# Patient Record
Sex: MALE | Race: OTHER | NOT HISPANIC OR LATINO | URBAN - METROPOLITAN AREA
[De-identification: names, ages, dates, MRNs, and addresses within clinical notes are randomized per-mention and may not be internally consistent; named-entity substitution may affect disease eponyms.]

---

## 2019-06-20 ENCOUNTER — INPATIENT (INPATIENT)
Facility: HOSPITAL | Age: 54
LOS: 8 days | Discharge: HOME | DRG: 286 | End: 2019-06-29
Attending: GENERAL PRACTICE | Admitting: GENERAL PRACTICE
Payer: COMMERCIAL

## 2019-06-20 VITALS
DIASTOLIC BLOOD PRESSURE: 99 MMHG | RESPIRATION RATE: 18 BRPM | OXYGEN SATURATION: 98 % | HEART RATE: 114 BPM | WEIGHT: 210.1 LBS | SYSTOLIC BLOOD PRESSURE: 148 MMHG | HEIGHT: 67 IN | TEMPERATURE: 98 F

## 2019-06-20 DIAGNOSIS — R94.31 ABNORMAL ELECTROCARDIOGRAM [ECG] [EKG]: ICD-10-CM

## 2019-06-20 DIAGNOSIS — E11.8 TYPE 2 DIABETES MELLITUS WITH UNSPECIFIED COMPLICATIONS: ICD-10-CM

## 2019-06-20 DIAGNOSIS — R31.0 GROSS HEMATURIA: ICD-10-CM

## 2019-06-20 DIAGNOSIS — N13.39 OTHER HYDRONEPHROSIS: ICD-10-CM

## 2019-06-20 DIAGNOSIS — I10 ESSENTIAL (PRIMARY) HYPERTENSION: ICD-10-CM

## 2019-06-20 DIAGNOSIS — E66.9 OBESITY, UNSPECIFIED: ICD-10-CM

## 2019-06-20 DIAGNOSIS — R74.8 ABNORMAL LEVELS OF OTHER SERUM ENZYMES: ICD-10-CM

## 2019-06-20 DIAGNOSIS — E78.3 HYPERCHYLOMICRONEMIA: ICD-10-CM

## 2019-06-20 DIAGNOSIS — I25.10 ATHEROSCLEROTIC HEART DISEASE OF NATIVE CORONARY ARTERY WITHOUT ANGINA PECTORIS: ICD-10-CM

## 2019-06-20 DIAGNOSIS — N21.1 CALCULUS IN URETHRA: ICD-10-CM

## 2019-06-20 DIAGNOSIS — E78.5 HYPERLIPIDEMIA, UNSPECIFIED: ICD-10-CM

## 2019-06-20 DIAGNOSIS — Z95.5 PRESENCE OF CORONARY ANGIOPLASTY IMPLANT AND GRAFT: Chronic | ICD-10-CM

## 2019-06-20 LAB
ALBUMIN SERPL ELPH-MCNC: 4 G/DL — SIGNIFICANT CHANGE UP (ref 3.3–5)
ALP SERPL-CCNC: 108 U/L — SIGNIFICANT CHANGE UP (ref 40–120)
ALT FLD-CCNC: 25 U/L — SIGNIFICANT CHANGE UP (ref 10–45)
ANION GAP SERPL CALC-SCNC: 16 MMOL/L — SIGNIFICANT CHANGE UP (ref 5–17)
APPEARANCE UR: CLEAR — SIGNIFICANT CHANGE UP
APTT BLD: 29.4 SEC — SIGNIFICANT CHANGE UP (ref 27.5–36.3)
APTT BLD: 34.2 SEC — SIGNIFICANT CHANGE UP (ref 27.5–36.3)
APTT BLD: 67.8 SEC — HIGH (ref 27.5–36.3)
AST SERPL-CCNC: 23 U/L — SIGNIFICANT CHANGE UP (ref 10–40)
BACTERIA # UR AUTO: NEGATIVE — SIGNIFICANT CHANGE UP
BASOPHILS # BLD AUTO: 0.1 K/UL — SIGNIFICANT CHANGE UP (ref 0–0.2)
BASOPHILS NFR BLD AUTO: 0.6 % — SIGNIFICANT CHANGE UP (ref 0–2)
BILIRUB SERPL-MCNC: 0.5 MG/DL — SIGNIFICANT CHANGE UP (ref 0.2–1.2)
BILIRUB UR-MCNC: NEGATIVE — SIGNIFICANT CHANGE UP
BLD GP AB SCN SERPL QL: NEGATIVE — SIGNIFICANT CHANGE UP
BUN SERPL-MCNC: 32 MG/DL — HIGH (ref 7–23)
CALCIUM SERPL-MCNC: 9.4 MG/DL — SIGNIFICANT CHANGE UP (ref 8.4–10.5)
CHLORIDE SERPL-SCNC: 98 MMOL/L — SIGNIFICANT CHANGE UP (ref 96–108)
CK MB BLD-MCNC: 3 % — SIGNIFICANT CHANGE UP (ref 0–3.5)
CK MB BLD-MCNC: 3 % — SIGNIFICANT CHANGE UP (ref 0–3.5)
CK MB CFR SERPL CALC: 6.5 NG/ML — SIGNIFICANT CHANGE UP (ref 0–6.7)
CK MB CFR SERPL CALC: 7.5 NG/ML — HIGH (ref 0–6.7)
CK SERPL-CCNC: 218 U/L — HIGH (ref 30–200)
CK SERPL-CCNC: 249 U/L — HIGH (ref 30–200)
CO2 SERPL-SCNC: 20 MMOL/L — LOW (ref 22–31)
COLOR SPEC: COLORLESS — SIGNIFICANT CHANGE UP
CREAT SERPL-MCNC: 1.66 MG/DL — HIGH (ref 0.5–1.3)
DIFF PNL FLD: ABNORMAL
EOSINOPHIL # BLD AUTO: 0.1 K/UL — SIGNIFICANT CHANGE UP (ref 0–0.5)
EOSINOPHIL NFR BLD AUTO: 0.7 % — SIGNIFICANT CHANGE UP (ref 0–6)
EPI CELLS # UR: 1 /HPF — SIGNIFICANT CHANGE UP
GAS PNL BLDV: SIGNIFICANT CHANGE UP
GLUCOSE BLDC GLUCOMTR-MCNC: 202 MG/DL — HIGH (ref 70–99)
GLUCOSE BLDC GLUCOMTR-MCNC: 273 MG/DL — HIGH (ref 70–99)
GLUCOSE BLDC GLUCOMTR-MCNC: 296 MG/DL — HIGH (ref 70–99)
GLUCOSE BLDC GLUCOMTR-MCNC: 304 MG/DL — HIGH (ref 70–99)
GLUCOSE SERPL-MCNC: 386 MG/DL — HIGH (ref 70–99)
GLUCOSE UR QL: ABNORMAL
HBA1C BLD-MCNC: 11.5 % — HIGH (ref 4–5.6)
HCT VFR BLD CALC: 47 % — SIGNIFICANT CHANGE UP (ref 39–50)
HCT VFR BLD CALC: 47.5 % — SIGNIFICANT CHANGE UP (ref 39–50)
HGB BLD-MCNC: 15.9 G/DL — SIGNIFICANT CHANGE UP (ref 13–17)
HGB BLD-MCNC: 16.5 G/DL — SIGNIFICANT CHANGE UP (ref 13–17)
HYALINE CASTS # UR AUTO: 0 /LPF — SIGNIFICANT CHANGE UP (ref 0–2)
INR BLD: 0.98 RATIO — SIGNIFICANT CHANGE UP (ref 0.88–1.16)
KETONES UR-MCNC: ABNORMAL
LEUKOCYTE ESTERASE UR-ACNC: NEGATIVE — SIGNIFICANT CHANGE UP
LIDOCAIN IGE QN: 37 U/L — SIGNIFICANT CHANGE UP (ref 7–60)
LYMPHOCYTES # BLD AUTO: 16 % — SIGNIFICANT CHANGE UP (ref 13–44)
LYMPHOCYTES # BLD AUTO: 2.3 K/UL — SIGNIFICANT CHANGE UP (ref 1–3.3)
MAGNESIUM SERPL-MCNC: 2 MG/DL — SIGNIFICANT CHANGE UP (ref 1.6–2.6)
MCHC RBC-ENTMCNC: 29.2 PG — SIGNIFICANT CHANGE UP (ref 27–34)
MCHC RBC-ENTMCNC: 30 PG — SIGNIFICANT CHANGE UP (ref 27–34)
MCHC RBC-ENTMCNC: 33.8 GM/DL — SIGNIFICANT CHANGE UP (ref 32–36)
MCHC RBC-ENTMCNC: 34.9 GM/DL — SIGNIFICANT CHANGE UP (ref 32–36)
MCV RBC AUTO: 86.2 FL — SIGNIFICANT CHANGE UP (ref 80–100)
MCV RBC AUTO: 86.5 FL — SIGNIFICANT CHANGE UP (ref 80–100)
MONOCYTES # BLD AUTO: 1.1 K/UL — HIGH (ref 0–0.9)
MONOCYTES NFR BLD AUTO: 7.8 % — SIGNIFICANT CHANGE UP (ref 2–14)
NEUTROPHILS # BLD AUTO: 10.7 K/UL — HIGH (ref 1.8–7.4)
NEUTROPHILS NFR BLD AUTO: 74.9 % — SIGNIFICANT CHANGE UP (ref 43–77)
NITRITE UR-MCNC: NEGATIVE — SIGNIFICANT CHANGE UP
PH UR: 6.5 — SIGNIFICANT CHANGE UP (ref 5–8)
PHOSPHATE SERPL-MCNC: 2.7 MG/DL — SIGNIFICANT CHANGE UP (ref 2.5–4.5)
PLATELET # BLD AUTO: 243 K/UL — SIGNIFICANT CHANGE UP (ref 150–400)
PLATELET # BLD AUTO: 264 K/UL — SIGNIFICANT CHANGE UP (ref 150–400)
POTASSIUM SERPL-MCNC: 4.9 MMOL/L — SIGNIFICANT CHANGE UP (ref 3.5–5.3)
POTASSIUM SERPL-SCNC: 4.9 MMOL/L — SIGNIFICANT CHANGE UP (ref 3.5–5.3)
PROT SERPL-MCNC: 8 G/DL — SIGNIFICANT CHANGE UP (ref 6–8.3)
PROT UR-MCNC: ABNORMAL
PROTHROM AB SERPL-ACNC: 11.2 SEC — SIGNIFICANT CHANGE UP (ref 10–12.9)
RBC # BLD: 5.43 M/UL — SIGNIFICANT CHANGE UP (ref 4.2–5.8)
RBC # BLD: 5.51 M/UL — SIGNIFICANT CHANGE UP (ref 4.2–5.8)
RBC # FLD: 13 % — SIGNIFICANT CHANGE UP (ref 10.3–14.5)
RBC # FLD: 13.1 % — SIGNIFICANT CHANGE UP (ref 10.3–14.5)
RBC CASTS # UR COMP ASSIST: 749 /HPF — HIGH (ref 0–4)
RH IG SCN BLD-IMP: POSITIVE — SIGNIFICANT CHANGE UP
SODIUM SERPL-SCNC: 134 MMOL/L — LOW (ref 135–145)
SP GR SPEC: 1.02 — SIGNIFICANT CHANGE UP (ref 1.01–1.02)
TROPONIN T, HIGH SENSITIVITY RESULT: 182 NG/L — HIGH (ref 0–51)
TROPONIN T, HIGH SENSITIVITY RESULT: 196 NG/L — HIGH (ref 0–51)
UROBILINOGEN FLD QL: NEGATIVE — SIGNIFICANT CHANGE UP
WBC # BLD: 13 K/UL — HIGH (ref 3.8–10.5)
WBC # BLD: 14.3 K/UL — HIGH (ref 3.8–10.5)
WBC # FLD AUTO: 13 K/UL — HIGH (ref 3.8–10.5)
WBC # FLD AUTO: 14.3 K/UL — HIGH (ref 3.8–10.5)
WBC UR QL: 2 /HPF — SIGNIFICANT CHANGE UP (ref 0–5)

## 2019-06-20 PROCEDURE — 93010 ELECTROCARDIOGRAM REPORT: CPT | Mod: NC

## 2019-06-20 PROCEDURE — 99285 EMERGENCY DEPT VISIT HI MDM: CPT | Mod: 25

## 2019-06-20 PROCEDURE — 99254 IP/OBS CNSLTJ NEW/EST MOD 60: CPT | Mod: GC

## 2019-06-20 PROCEDURE — 71275 CT ANGIOGRAPHY CHEST: CPT | Mod: 26

## 2019-06-20 PROCEDURE — 71045 X-RAY EXAM CHEST 1 VIEW: CPT | Mod: 26

## 2019-06-20 PROCEDURE — 99255 IP/OBS CONSLTJ NEW/EST HI 80: CPT

## 2019-06-20 PROCEDURE — 74174 CTA ABD&PLVS W/CONTRAST: CPT | Mod: 26

## 2019-06-20 RX ORDER — HEPARIN SODIUM 5000 [USP'U]/ML
INJECTION INTRAVENOUS; SUBCUTANEOUS
Qty: 25000 | Refills: 0 | Status: DISCONTINUED | OUTPATIENT
Start: 2019-06-20 | End: 2019-06-20

## 2019-06-20 RX ORDER — INSULIN GLARGINE 100 [IU]/ML
10 INJECTION, SOLUTION SUBCUTANEOUS AT BEDTIME
Refills: 0 | Status: DISCONTINUED | OUTPATIENT
Start: 2019-06-20 | End: 2019-06-20

## 2019-06-20 RX ORDER — INSULIN LISPRO 100/ML
VIAL (ML) SUBCUTANEOUS
Refills: 0 | Status: DISCONTINUED | OUTPATIENT
Start: 2019-06-20 | End: 2019-06-20

## 2019-06-20 RX ORDER — SIMVASTATIN 20 MG/1
20 TABLET, FILM COATED ORAL AT BEDTIME
Refills: 0 | Status: DISCONTINUED | OUTPATIENT
Start: 2019-06-20 | End: 2019-06-22

## 2019-06-20 RX ORDER — DEXTROSE 50 % IN WATER 50 %
25 SYRINGE (ML) INTRAVENOUS ONCE
Refills: 0 | Status: DISCONTINUED | OUTPATIENT
Start: 2019-06-20 | End: 2019-06-29

## 2019-06-20 RX ORDER — INSULIN LISPRO 100/ML
5 VIAL (ML) SUBCUTANEOUS
Refills: 0 | Status: DISCONTINUED | OUTPATIENT
Start: 2019-06-20 | End: 2019-06-21

## 2019-06-20 RX ORDER — SODIUM CHLORIDE 9 MG/ML
1000 INJECTION, SOLUTION INTRAVENOUS
Refills: 0 | Status: DISCONTINUED | OUTPATIENT
Start: 2019-06-20 | End: 2019-06-29

## 2019-06-20 RX ORDER — AMLODIPINE BESYLATE 2.5 MG/1
5 TABLET ORAL DAILY
Refills: 0 | Status: DISCONTINUED | OUTPATIENT
Start: 2019-06-20 | End: 2019-06-20

## 2019-06-20 RX ORDER — LOSARTAN POTASSIUM 100 MG/1
25 TABLET, FILM COATED ORAL DAILY
Refills: 0 | Status: DISCONTINUED | OUTPATIENT
Start: 2019-06-20 | End: 2019-06-21

## 2019-06-20 RX ORDER — DEXTROSE 50 % IN WATER 50 %
12.5 SYRINGE (ML) INTRAVENOUS ONCE
Refills: 0 | Status: DISCONTINUED | OUTPATIENT
Start: 2019-06-20 | End: 2019-06-20

## 2019-06-20 RX ORDER — HEPARIN SODIUM 5000 [USP'U]/ML
5600 INJECTION INTRAVENOUS; SUBCUTANEOUS EVERY 6 HOURS
Refills: 0 | Status: DISCONTINUED | OUTPATIENT
Start: 2019-06-20 | End: 2019-06-20

## 2019-06-20 RX ORDER — DEXTROSE 50 % IN WATER 50 %
15 SYRINGE (ML) INTRAVENOUS ONCE
Refills: 0 | Status: DISCONTINUED | OUTPATIENT
Start: 2019-06-20 | End: 2019-06-20

## 2019-06-20 RX ORDER — SIMVASTATIN 20 MG/1
1 TABLET, FILM COATED ORAL
Qty: 0 | Refills: 0 | DISCHARGE

## 2019-06-20 RX ORDER — INSULIN GLARGINE 100 [IU]/ML
25 INJECTION, SOLUTION SUBCUTANEOUS AT BEDTIME
Refills: 0 | Status: DISCONTINUED | OUTPATIENT
Start: 2019-06-20 | End: 2019-06-23

## 2019-06-20 RX ORDER — DEXTROSE 50 % IN WATER 50 %
25 SYRINGE (ML) INTRAVENOUS ONCE
Refills: 0 | Status: DISCONTINUED | OUTPATIENT
Start: 2019-06-20 | End: 2019-06-20

## 2019-06-20 RX ORDER — DEXTROSE 50 % IN WATER 50 %
12.5 SYRINGE (ML) INTRAVENOUS ONCE
Refills: 0 | Status: DISCONTINUED | OUTPATIENT
Start: 2019-06-20 | End: 2019-06-29

## 2019-06-20 RX ORDER — SODIUM CHLORIDE 9 MG/ML
500 INJECTION INTRAMUSCULAR; INTRAVENOUS; SUBCUTANEOUS ONCE
Refills: 0 | Status: COMPLETED | OUTPATIENT
Start: 2019-06-20 | End: 2019-06-20

## 2019-06-20 RX ORDER — GLUCAGON INJECTION, SOLUTION 0.5 MG/.1ML
1 INJECTION, SOLUTION SUBCUTANEOUS ONCE
Refills: 0 | Status: DISCONTINUED | OUTPATIENT
Start: 2019-06-20 | End: 2019-06-29

## 2019-06-20 RX ORDER — LOSARTAN POTASSIUM 100 MG/1
0 TABLET, FILM COATED ORAL
Qty: 0 | Refills: 0 | DISCHARGE

## 2019-06-20 RX ORDER — DEXTROSE 50 % IN WATER 50 %
15 SYRINGE (ML) INTRAVENOUS ONCE
Refills: 0 | Status: DISCONTINUED | OUTPATIENT
Start: 2019-06-20 | End: 2019-06-29

## 2019-06-20 RX ORDER — INSULIN LISPRO 100/ML
VIAL (ML) SUBCUTANEOUS
Refills: 0 | Status: DISCONTINUED | OUTPATIENT
Start: 2019-06-20 | End: 2019-06-29

## 2019-06-20 RX ORDER — ASPIRIN/CALCIUM CARB/MAGNESIUM 324 MG
81 TABLET ORAL DAILY
Refills: 0 | Status: DISCONTINUED | OUTPATIENT
Start: 2019-06-20 | End: 2019-06-29

## 2019-06-20 RX ORDER — AMLODIPINE BESYLATE 2.5 MG/1
5 TABLET ORAL DAILY
Refills: 0 | Status: DISCONTINUED | OUTPATIENT
Start: 2019-06-20 | End: 2019-06-25

## 2019-06-20 RX ORDER — GLUCAGON INJECTION, SOLUTION 0.5 MG/.1ML
1 INJECTION, SOLUTION SUBCUTANEOUS ONCE
Refills: 0 | Status: DISCONTINUED | OUTPATIENT
Start: 2019-06-20 | End: 2019-06-20

## 2019-06-20 RX ORDER — INSULIN LISPRO 100/ML
VIAL (ML) SUBCUTANEOUS AT BEDTIME
Refills: 0 | Status: DISCONTINUED | OUTPATIENT
Start: 2019-06-20 | End: 2019-06-20

## 2019-06-20 RX ORDER — SODIUM CHLORIDE 9 MG/ML
1000 INJECTION, SOLUTION INTRAVENOUS
Refills: 0 | Status: DISCONTINUED | OUTPATIENT
Start: 2019-06-20 | End: 2019-06-20

## 2019-06-20 RX ORDER — HEPARIN SODIUM 5000 [USP'U]/ML
5000 INJECTION INTRAVENOUS; SUBCUTANEOUS EVERY 8 HOURS
Refills: 0 | Status: DISCONTINUED | OUTPATIENT
Start: 2019-06-20 | End: 2019-06-23

## 2019-06-20 RX ORDER — INSULIN DETEMIR 100/ML (3)
0 INSULIN PEN (ML) SUBCUTANEOUS
Qty: 0 | Refills: 0 | DISCHARGE

## 2019-06-20 RX ADMIN — Medication 5 UNIT(S): at 17:32

## 2019-06-20 RX ADMIN — Medication 1 TABLET(S): at 13:56

## 2019-06-20 RX ADMIN — SODIUM CHLORIDE 500 MILLILITER(S): 9 INJECTION INTRAMUSCULAR; INTRAVENOUS; SUBCUTANEOUS at 06:25

## 2019-06-20 RX ADMIN — SIMVASTATIN 20 MILLIGRAM(S): 20 TABLET, FILM COATED ORAL at 22:02

## 2019-06-20 RX ADMIN — Medication 81 MILLIGRAM(S): at 13:57

## 2019-06-20 RX ADMIN — HEPARIN SODIUM 1300 UNIT(S)/HR: 5000 INJECTION INTRAVENOUS; SUBCUTANEOUS at 18:44

## 2019-06-20 RX ADMIN — Medication 3: at 17:31

## 2019-06-20 RX ADMIN — INSULIN GLARGINE 25 UNIT(S): 100 INJECTION, SOLUTION SUBCUTANEOUS at 22:02

## 2019-06-20 RX ADMIN — HEPARIN SODIUM 5000 UNIT(S): 5000 INJECTION INTRAVENOUS; SUBCUTANEOUS at 22:01

## 2019-06-20 RX ADMIN — HEPARIN SODIUM 1000 UNIT(S)/HR: 5000 INJECTION INTRAVENOUS; SUBCUTANEOUS at 09:07

## 2019-06-20 RX ADMIN — Medication 3: at 13:48

## 2019-06-20 RX ADMIN — HEPARIN SODIUM 5600 UNIT(S): 5000 INJECTION INTRAVENOUS; SUBCUTANEOUS at 18:44

## 2019-06-20 RX ADMIN — LOSARTAN POTASSIUM 25 MILLIGRAM(S): 100 TABLET, FILM COATED ORAL at 13:57

## 2019-06-20 RX ADMIN — AMLODIPINE BESYLATE 5 MILLIGRAM(S): 2.5 TABLET ORAL at 13:56

## 2019-06-20 NOTE — PROVIDER CONTACT NOTE (OTHER) - ACTION/TREATMENT ORDERED:
NP aware, at the bedside to assess. repeat BP left arm 101/67 right arm 84/54. awaiting orders. will continue to monitor.

## 2019-06-20 NOTE — H&P ADULT - PROBLEM SELECTOR PLAN 6
due to above:  per CT scan likely due to passed stone ( stone seen in bladder)   monitor for now  keep norris for now until pt better and OOB / ambulatory

## 2019-06-20 NOTE — CONSULT NOTE ADULT - ASSESSMENT
ELIZABETH LIPSCOMB is a 52 yo M with poorly controlled Type 2 DM, on basal insulin plus metformin/Januvia combination, complicated by CAD s/p cardiac stents x2, presenting with abdominal pain, likely due to renal stone, found to have hyperglycemia and A1C of 11.5%

## 2019-06-20 NOTE — ED ADULT NURSE REASSESSMENT NOTE - NS ED NURSE REASSESS COMMENT FT1
Received report from Kelli WELLER. Patient resting comfortably in stretcher. A&Ox4. VSS. NAD noted. Reports 5/10 periumbilical pain. Abdomen soft, non-distended and non-tender upon palpation. Patient denies chest pain, SOB, fever/chills at this time. Patient pending CT angiogram results. Plan of care discussed. Safety and comfort measures maintained. Received report from Kelli WELLER. Patient resting comfortably in stretcher. A&Ox4. VSS. NAD noted. Reports 5/10 periumbilical pain. Abdomen soft, non-distended and non-tender upon palpation. Patient denies chest pain, SOB, fever/chills at this time. Patient pending CT angiogram results. Mc catheter in place. 500 mL of red tinged urine in the drainage bag. Plan of care discussed. Safety and comfort measures maintained.

## 2019-06-20 NOTE — ED PROVIDER NOTE - ATTENDING CONTRIBUTION TO CARE
Afebrile. Awake and Alert. Lungs CTA. Heart tachy regular. Abdomen soft NTND. CN II-XII grossly intact. Moves all extremities without lateralization.    Pt transferred in stable condition from OSH for NSTEMI on Hep ggt, ASA and Plavix given PTA, No chest pain. EKG abnormal biphasic t-waves. Troponin elevated. Cardiology fellow notified on arrival.    Left flank pain radiating into arm: Will obtain CTA r/o dissection    Passed left renal stone on CT a/p. Cleared for tele admit by cardiology service.

## 2019-06-20 NOTE — CONSULT NOTE ADULT - PROBLEM SELECTOR RECOMMENDATION 3
Patient high ASCVD risk given prior history of CAD.    LDL goal should be less than 70m/dl, please check lipid panel  Would recommend increasing Statin those to high intensity if able to tolerate with Atorvastatin 40-80mg/d or Crestor 20mg daily if not at goal.

## 2019-06-20 NOTE — ED ADULT NURSE NOTE - OBJECTIVE STATEMENT
53 year old male presents to the ED via EMS as tx from [facility] for cardiology. Pt went to [facility] yesterday for LLQ abd pain, denied CP or SOB, found to have ST elevation in 2 leads. Pt has hx of 2 stents, last one 7-8 years ago. Upon arrival to ED, tachypneic, 2LNC in place O2 sat 96%. Pt unable to urinate since 12 midnight, bladder scan showed >300cc urine, norris inserted with 2 RN in place via sterile technique and 500cc dark yellow urine drained. Pt still denies CP, N/V/D, or back pain. Comfort and safety measures maintained.

## 2019-06-20 NOTE — ED ADULT NURSE NOTE - NSIMPLEMENTINTERV_GEN_ALL_ED
Implemented All Fall with Harm Risk Interventions:  [place] to call system. Call bell, personal items and telephone within reach. Instruct patient to call for assistance. Room bathroom lighting operational. Non-slip footwear when patient is off stretcher. Physically safe environment: no spills, clutter or unnecessary equipment. Stretcher in lowest position, wheels locked, appropriate side rails in place. Provide visual cue, wrist band, yellow gown, etc. Monitor gait and stability. Monitor for mental status changes and reorient to person, place, and time. Review medications for side effects contributing to fall risk. Reinforce activity limits and safety measures with patient and family. Provide visual clues: red socks.

## 2019-06-20 NOTE — ED PROVIDER NOTE - OBJECTIVE STATEMENT
52 y/o male PMHx CAD s/p stent x2, HTN, HLD, DM on insulin transferred via EMS for ST elevation V2-V4 seen on EKG at [facility]. Patient stated he had LLQ pain and left hand numbness. Patient stated the abdominal pain started 7AM and was intermittent. Patient had two episodes bilious non bloody emesis prior to [facility] Arrival. Patient reported the pain improved with Advil 400mg BID last dose 8pm. Patient started he was unable to void since 5 hours ago. Patient noted to ARTURO on EKG therefore per EMS patient received ASA 325mg, heparin bolus, Brilinta 180, and hydraz 10mg IV as pt noted to be hypertensive SBP 220s. Patient ISRAEL, SOB, back pain, neck pain, dizziness, weakness in extremity, blurry vision, gait abnormality, slurred speech. Patient last stent was placed 6 years ago and last stress was 1 year ago which was reportedly normal per patient. Patient does not remember the name of cardiologist at [facility]. Patient former smoker 1/2ppd x10 years and last smoker 5 years ago. Denied family h/o CAD <60 years 54 y/o male PMHx CAD s/p stent x2, HTN, HLD, DM on insulin transferred via EMS for ST elevation V2-V4 seen on EKG at [facility]. Patient stated he had LLQ pain and left hand numbness. Patient stated the abdominal pain started 7AM and was intermittent. Patient had two episodes bilious non bloody emesis prior to [facility] Arrival. Patient reported the pain improved with Advil 400mg BID last dose 8pm. Patient started he was unable to void since 5 hours ago. Patient noted to ARTURO on EKG therefore per EMS patient received ASA 325mg, heparin bolus, Brilinta 180, and hydraz 10mg IV as pt noted to be hypertensive SBP 220s. Patient ISRAEL, SOB, back pain, neck pain, dizziness, weakness in extremity, blurry vision, gait abnormality, slurred speech. Patient last stent was placed 6 years ago and last stress was 1 year ago which was reportedly normal per patient. Patient does not remember the name of cardiologist at [facility]. Patient former smoker 1/2ppd x10 years and last smoker 5 years ago. Denied family h/o CAD <60 years. Patient returned from [facility] on 6/18/19

## 2019-06-20 NOTE — CONSULT NOTE ADULT - NSHPATTENDINGPLANDISCUSS_GEN_ALL_CORE
Plan discussed with cardiology fellow, Dr. Phelps; patient seen and examined.       I was physically present for the key portions of the evaluation and management (E/M) service provided.    I agree with the above history, physical, and plan which I have reviewed and edited where appropriate.
NP Zabrina

## 2019-06-20 NOTE — CONSULT NOTE ADULT - PROBLEM SELECTOR RECOMMENDATION 9
Poorly uncontrolled due to dietary indiscretion as well as non-adherence to medications, missing basal insulin multiple times a week.   Discussed basal bolus regimen on discharge which he agree to.  Right now he is NPO.   -Will increase Lantus to 25 units at bedtime  -Continue with Humalog low does sliding scald qac and at bedtime, if starting PO, would recommend adding on Humalog 6 units TID with meals and will titrate up/down from there  -RD consult please! Poor understanding of dietary recommendations.      If patient wishes to follow up with [facility]   [facility]  [address] [place] [address], [address], [place] [address]  [phone]   Please call to schedule appointment with CDE/Nutritionist and MD appointment next available    For discharge, basal bolus (does to be determined) plus metformin 500mg bid (but because patient received contrast, would hold for 72 hours post contrast dose)

## 2019-06-20 NOTE — ED ADULT TRIAGE NOTE - CHIEF COMPLAINT QUOTE
brought in by ems as a transfer from [facility] for cards evaluation  ekg changes and hypertensive

## 2019-06-20 NOTE — ED PROVIDER NOTE - PROGRESS NOTE DETAILS
Cally URIBE: Received signout on patient. No acute intervention from cards. Started hep drip admitted to [facility] on tele.

## 2019-06-20 NOTE — H&P ADULT - HISTORY OF PRESENT ILLNESS
>>>>>>Medical Attending Initial / Admission note<<<<<<  -------------------------------------------------------------------------------  CHIEF COMPLAINT & HISTORY OF PRESENT ILLNESS:      pt is a 54 yo man with PMH of CAD (2 stents 6-7 yrs ago), DM, HTN, HLD, former smoker, presented to [facility] with c/o lower abd pain.   He was noted to have new biphasic t-waves in V2-V6 and transferred to [facility] for new EKG abnormalities.   Pt denies any recent chest pain or dyspnea at rest or with exertion.  No palpitations, dizziness, peripheral edema.   He reported normal stress test and echo within the past year ( as per cardio note).    His chief complaint is LLQ abd pain that started yesterday and was associated with dysuria  a Norris catheter was placed in ED for difficulty urinating / retaining and a CT showed likely a passed left renal stone, with stone seen in the bladder  pt has been started and remains on heparin drip with mildly blood tinged urine in norris bag    --------------------------------------------------------------------------------  PAST MEDICAL HISTORY:    DM  HTN  CAD  HLD  [obesity]    --------------------------------------------------------------------------------  PAST SURGICAL HISTORY:  none reported    --------------------------------------------------------------------------------  FAMILY HISTORY:  none reported ( no FH of cancer or heart diease !)     --------------------------------------------------------------------------------  SOCIAL HISTORY:  Alcohol: + socially   Smoking: past smoker 1/2 PPD for 10 years, Quit ~30 years ago  works as a  	    --------------------------------------------------------------------------------  ALLERGIES:    [As jenae, reviewed]    --------------------------------------------------------------------------------  MEDICATIONS:   [As jenae, reviewed]    --------------------------------------------------------------------------------  REVIEW OF SYSTEM:    GEN: no fever, no chills, + still with mild discomfort in lower abdomen   RESP: no SOB, no cough, no sputum  CVS: no chest pain, no palpitations, no edema  GI: abdominal pain as above , no nausea, no vomiting, no constipation  : discomfort as above   Neuro: no headache, no dizziness  PSYCH: no anxiety, no depression  Derm : no itching, no rash    --------------------------------------------------------------------------------  VITAL SIGNS:     Vital Signs Last 24 Hrs  T(C): 36.9 (06-20-19 @ 10:29)  T(F): 98.5 (06-20-19 @ 10:29), Max: 98.5 (06-20-19 @ 10:29)  HR: 108 (06-20-19 @ 10:29) (108 - 116)  BP: 168/106 (06-20-19 @ 10:29)  RR: 20 (06-20-19 @ 10:29) (18 - 26)  SpO2: 98% (06-20-19 @ 10:29) (98% - 99%)      POCT Blood Glucose.: 304 mg/dL (20 Jun 2019 10:18)  POCT Blood Glucose.: 324 mg/dL (20 Jun 2019 05:11)    --------------------------------------------------------------------------------  PHYSICAL EXAM:    GEN: A&O X 3 , NAD , comfortable, snoring while asleep   HEENT: NCAT, PERRL, MMM, hearing intact  Neck: supple , no JVD  CVS: S1S2 , regular , No M/R/G appreciated  PULM: CTA B/L,  no W/R/R appreciated  ABD.: soft. non tender, non distended,  bowel sounds present, obese   Extrem: intact pulses , no edema      + norris with mildly blood tinged urine   Derm: No rash , no ecchymoses  PSYCH : normal mood,  no delusion not anxious    --------------------------------------------------------------------------------  LAB AND IMAGING:   [As bellow, reviewed]    --------------------------------------------------------------------------------  ASSESSMENT AND PLAN:   [As bellow]    --------------------  Case discussed with pt  ___________________________  HMelida De León D.O.  Pager: [phone]

## 2019-06-20 NOTE — CONSULT NOTE ADULT - SUBJECTIVE AND OBJECTIVE BOX
HPI:    pt is a 54 yo man with PMH of CAD (2 stents 6-7 yrs ago), DM, HTN, HLD, former smoker, presented to [facility] with c/o lower abd pain.   He was noted to have new biphasic t-waves in V2-V6 and transferred to [facility] for new EKG abnormalities.   Pt denies any recent chest pain or dyspnea at rest or with exertion.  No palpitations, dizziness, peripheral edema.   He reported normal stress test and echo within the past year ( as per cardio note).    His chief complaint is LLQ abd pain that started yesterday and was associated with dysuria  a Norris catheter was placed in ED for difficulty urinating / retaining and a CT showed likely a passed left renal stone, with stone seen in the bladder  pt has been started and remains on heparin drip with mildly blood tinged urine in norris bag      PAST MEDICAL & SURGICAL HISTORY:  CAD (coronary artery disease): s/p stent x2 in 2013  Diabetes  HLD (hyperlipidemia)  HTN (hypertension)  Stented coronary artery      FAMILY HISTORY:      Social History:    Outpatient Medications:      MEDICATIONS  (STANDING):  amLODIPine   Tablet 5 milliGRAM(s) Oral daily  aspirin enteric coated 81 milliGRAM(s) Oral daily  dextrose 5%. 1000 milliLiter(s) (50 mL/Hr) IV Continuous <Continuous>  dextrose 50% Injectable 12.5 Gram(s) IV Push once  dextrose 50% Injectable 25 Gram(s) IV Push once  dextrose 50% Injectable 25 Gram(s) IV Push once  heparin  Infusion.  Unit(s)/Hr (10 mL/Hr) IV Continuous <Continuous>  insulin glargine Injectable (LANTUS) 10 Unit(s) SubCutaneous at bedtime  insulin lispro (HumaLOG) corrective regimen sliding scale   SubCutaneous three times a day before meals  losartan 25 milliGRAM(s) Oral daily  multivitamin 1 Tablet(s) Oral daily  simvastatin 20 milliGRAM(s) Oral at bedtime    MEDICATIONS  (PRN):  dextrose 40% Gel 15 Gram(s) Oral once PRN Blood Glucose LESS THAN 70 milliGRAM(s)/deciliter  glucagon  Injectable 1 milliGRAM(s) IntraMuscular once PRN Glucose LESS THAN 70 milligrams/deciliter  heparin  Injectable 5600 Unit(s) IV Push every 6 hours PRN For aPTT less than 40      Allergies    No Known Allergies    Intolerances      Review of Systems:  Constitutional: No fever  Eyes: No blurry vision  Neuro: No tremors  HEENT: No pain  Cardiovascular: No chest pain, palpitations  Respiratory: No SOB, no cough  GI: No nausea, vomiting, abdominal pain  : No dysuria  Skin: no rash  Psych: no depression  Endocrine: no polyuria, polydipsia  Hem/lymph: no swelling  Osteoporosis: no fractures    ALL OTHER SYSTEMS REVIEWED AND NEGATIVE    UNABLE TO OBTAIN    PHYSICAL EXAM:  -----------------------------  VITALS: T(C): 36.9 (06-20-19 @ 10:29)  T(F): 98.5 (06-20-19 @ 10:29), Max: 98.5 (06-20-19 @ 10:29)  HR: 108 (06-20-19 @ 10:29) (108 - 116)  BP: 168/106 (06-20-19 @ 10:29) (148/99 - 173/100)  RR:  (18 - 26)  SpO2:  (98% - 99%)  Wt(kg): --  GENERAL: NAD, well-groomed, well-developed  EYES: No proptosis, no lid lag, anicteric  HEENT:  Atraumatic, Normocephalic, moist mucous membranes  THYROID: Normal size, no palpable nodules  RESPIRATORY: Clear to auscultation bilaterally; No rales, rhonchi, wheezing, or rubs  CARDIOVASCULAR: Regular rate and rhythm; No murmurs; no peripheral edema  GI: Soft, nontender, non distended, normal bowel sounds  SKIN: Dry, intact, No rashes or lesions  MUSCULOSKELETAL: Full range of motion, normal strength  NEURO: sensation intact, extraocular movements intact, no tremor, normal reflexes  PSYCH: Alert and oriented x 3, normal affect, normal mood  CUSHING'S SIGNS: no striae    POCT Blood Glucose.: 273 mg/dL (06-20-19 @ 13:00)  POCT Blood Glucose.: 304 mg/dL (06-20-19 @ 10:18)  POCT Blood Glucose.: 324 mg/dL (06-20-19 @ 05:11)                            16.5   14.3  )-----------( 264      ( 20 Jun 2019 05:17 )             47.5       06-20    134<L>  |  98  |  32<H>  ----------------------------<  386<H>  4.9   |  20<L>  |  1.66<H>    EGFR if : 53<L>  EGFR if non : 46<L>    Ca    9.4      06-20  Mg     2.0     06-20  Phos  2.7     06-20    TPro  8.0  /  Alb  4.0  /  TBili  0.5  /  DBili  x   /  AST  23  /  ALT  25  /  AlkPhos  108  06-20      Thyroid Function Tests:      Hemoglobin A1C, Whole Blood: 11.5 % <H> [4.0 - 5.6] (06-20-19 @ 09:26)          Radiology:   ------------------------ HPI:    pt is a 52 yo man with PMH of CAD (2 stents 6-7 yrs ago), DM, HTN, HLD, former smoker, presented to [facility] with c/o lower abd pain.   He was noted to have new biphasic t-waves in V2-V6 and transferred to [facility] for new EKG abnormalities.   Pt denies any recent chest pain or dyspnea at rest or with exertion.  No palpitations, dizziness, peripheral edema.   He reported normal stress test and echo within the past year ( as per cardio note).    His chief complaint is LLQ abd pain that started yesterday and was associated with dysuria  a Norris catheter was placed in ED for difficulty urinating / retaining and a CT showed likely a passed left renal stone, with stone seen in the bladder  pt has been started and remains on heparin drip with mildly blood tinged urine in norris bag.     Patient states that he was diagnosed with Type 2 DM about 10-20 years ago.  Complicated by CAD s/p 2 cardiac stents.  Patient denies any microvascular complications such as retinopathy (last CRYSTAL 2 days ago), no neuropathy (but has not seen podiatry for a while), there is nephropathy (EGFR 46 during this admission 6/20/2019).  He is followed by his primary care doctor at [facility].  Patient's current regimen include Basaglar 25 units at bedtime and Janumet 50/1000 twice daily.  He checks his glucose 2-3 times a week.  He states that the numbers are usually high.  He also states that he misses Basaglar does about 2-3 times a week.  He is employed, does  works.  He is from [facility].  In terms of diet, breakfast: cup of tea, sandwich, lunch: usually snacks and reports that he consumes a large dinner with lots of Carbs.      Regarding hypertension: He current takes amlodipine 5mg daily and Losartan 25mg daily.  He denies hypotension.    Regarding HLD, currently takes simvastatin 20mg daily     PAST MEDICAL & SURGICAL HISTORY:  CAD (coronary artery disease): s/p stent x2 in 2013  Diabetes  HLD (hyperlipidemia)  HTN (hypertension)  Stented coronary artery      FAMILY HISTORY:  No family history of heart disease     Social History:  Former smoker, quitted 13 years ago.   No alcohol  No drugs    Home Medications:  aspirin 81 mg oral delayed release capsule: 1 tab(s) orally once a day (20 Jun 2019 13:45)  Janumet 50 mg-1000 mg oral tablet: 1 tab(s) orally 2 times a day (20 Jun 2019 13:45)  Levemir FlexPen 100 units/mL subcutaneous solution: 20-25 units once a day at bedtime (20 Jun 2019 13:45)  Lipitor 10 mg oral tablet: 1 tab(s) orally once a day (20 Jun 2019 13:45)  Norvasc 5 mg oral tablet: 1 tab(s) orally once a day (20 Jun 2019 13:45)    MEDICATIONS  (STANDING):  amLODIPine   Tablet 5 milliGRAM(s) Oral daily  aspirin enteric coated 81 milliGRAM(s) Oral daily  heparin  Infusion.  Unit(s)/Hr (10 mL/Hr) IV Continuous <Continuous>  insulin glargine Injectable (LANTUS) 10 Unit(s) SubCutaneous at bedtime  insulin lispro (HumaLOG) corrective regimen sliding scale   SubCutaneous three times a day before meals  losartan 25 milliGRAM(s) Oral daily  multivitamin 1 Tablet(s) Oral daily  simvastatin 20 milliGRAM(s) Oral at bedtime    MEDICATIONS  (PRN):  dextrose 40% Gel 15 Gram(s) Oral once PRN Blood Glucose LESS THAN 70 milliGRAM(s)/deciliter  glucagon  Injectable 1 milliGRAM(s) IntraMuscular once PRN Glucose LESS THAN 70 milligrams/deciliter  heparin  Injectable 5600 Unit(s) IV Push every 6 hours PRN For aPTT less than 40      Allergies    No Known Allergies    Review of Systems:  Constitutional: No fever  Eyes: No blurry vision  Neuro: No tremors  HEENT: No pain  Cardiovascular: No chest pain, palpitations  Respiratory: No SOB, no cough  GI: No nausea, vomiting, + left lower quadrant abdominal pain  : No dysuria  Skin: no rash  Psych: no depression  Endocrine: no polyuria, polydipsia  Hem/lymph: no swelling  Osteoporosis: no fractures    ALL OTHER SYSTEMS REVIEWED AND NEGATIVE    PHYSICAL EXAM:  -----------------------------  VITALS: T(C): 36.9 (06-20-19 @ 10:29)  T(F): 98.5 (06-20-19 @ 10:29), Max: 98.5 (06-20-19 @ 10:29)  HR: 108 (06-20-19 @ 10:29) (108 - 116)  BP: 168/106 (06-20-19 @ 10:29) (148/99 - 173/100)  RR:  (18 - 26)  SpO2:  (98% - 99%)  Wt(kg): --  GENERAL: NAD, well-groomed, well-developed  EYES: No proptosis, no lid lag, anicteric  HEENT:  Atraumatic, Normocephalic, moist mucous membranes  THYROID: Normal size, no palpable nodules  RESPIRATORY: Clear to auscultation bilaterally; No rales, rhonchi, wheezing, or rubs  CARDIOVASCULAR: Regular rate and rhythm; No murmurs; no peripheral edema  GI: Soft, nontender, non distended, normal bowel sounds  SKIN: Dry, intact, No rashes or lesions, dry feet, foot exam: visual exam, dry.  Thickened nails bilatterally.  Sensations intake.   MUSCULOSKELETAL: Full range of motion, normal strength  NEURO: sensation intact, extraocular movements intact, no tremor, normal reflexes  PSYCH: Alert and oriented x 3, normal affect, normal mood  CUSHING'S SIGNS: no striae    POCT Blood Glucose.: 273 mg/dL (06-20-19 @ 13:00)  POCT Blood Glucose.: 304 mg/dL (06-20-19 @ 10:18)  POCT Blood Glucose.: 324 mg/dL (06-20-19 @ 05:11)                           16.5   14.3  )-----------( 264      ( 20 Jun 2019 05:17 )             47.5       06-20    134<L>  |  98  |  32<H>  ----------------------------<  386<H>  4.9   |  20<L>  |  1.66<H>    EGFR if : 53<L>  EGFR if non : 46<L>    Ca    9.4      06-20  Mg     2.0     06-20  Phos  2.7     06-20    TPro  8.0  /  Alb  4.0  /  TBili  0.5  /  DBili  x   /  AST  23  /  ALT  25  /  AlkPhos  108  06-20      Thyroid Function Tests:      Hemoglobin A1C, Whole Blood: 11.5 % <H> [4.0 - 5.6] (06-20-19 @ 09:26)      Radiology:   ------------------------  < from: CT Angio Abdomen and Pelvis w/ IV Cont (06.20.19 @ 06:15) >  EXAM:  CT ANGIO CHEST (W)AW IC                          EXAM:  CT ANGIO ABD PELV (W)AW IC                            PROCEDURE DATE:  06/20/2019            INTERPRETATION:  CLINICAL INFORMATION: Chest pain. Evaluate for aortic   dissection.    COMPARISON: None.    PROCEDURE:   CT Angiography of the Chest, Abdomen and Pelvis.   Gated precontrast imaging was performed through the heart followed by   gated CT Angiography of the heart with subsequent non-gated arterial   phase imaging of the chest,abdomen and pelvis.  Intravenous contrast: 90 ml Omnipaque 350. 10 ml discarded.  Oral contrast: None.  Sagittal and coronal reformats were performed as well as 3D (MIP)   reconstructions.    FINDINGS:    CHEST:     LUNGS AND LARGE AIRWAYS: Patent central airways. No pulmonary nodules.  PLEURA: No pleural effusion.  VESSELS: No aortic dissection, penetrating aortic ulcer, or intramural   hematoma. Calcified coronary and aortic atherosclerosis.  HEART: Heart size is normal. No pericardial effusion.  MEDIASTINUM AND CARLOS: No lymphadenopathy.  CHEST WALL AND LOWER NECK: Within normal limits.    ABDOMEN AND PELVIS:    LIVER: Hepatic steatosis.  BILE DUCTS: Normal caliber.  GALLBLADDER: Within normal limits.  SPLEEN: Within normal limits.  PANCREAS: Within normal limits.  ADRENALS: Within normal limits.  KIDNEYS/URETERS: 6 mm sized hypodense lesion in the right renal lower   pole is a nonobstructing stone or calyceal diverticulum (17-81)..   Nonobstructive left renal calculus. There is a left perinephric stranding   with mild hydroureteronephrosis. Multiple bilateral subcentimeter   low-attenuation renal lesions, too small to characterize.     BLADDER: Bladder diverticulum. Norris catheter terminates in the bladder.   There is intraluminal air, likely secondary to recent instrumentation.   There is 2 mm sized stone in the midline.  REPRODUCTIVE ORGANS: Prostate within normal limits.    BOWEL: No bowel obstruction.   PERITONEUM: No ascites.  RETROPERITONEUM: No lymphadenopathy.    ABDOMINAL WALL: Within normal limits.  BONES: Spinal degenerative changes     IMPRESSION:     No aortic dissection, penetrating aortic ulcer, or intramural hematoma.     Mild left hydroureteronephrosis with perinephric stranding and a 2 mm   sized calcific density in the midline of the bladder, likely a passed   obstructing stone.        QUINN GONZALEZ M.D., RADIOLOGY RESIDENT  This document has been electronically signed.  ASIM MONSON M.D., ATTENDING RADIOLOGIST  This document has been electronicallysigned. Jun 20 2019  7:13AM        < end of copied text >

## 2019-06-20 NOTE — PROVIDER CONTACT NOTE (OTHER) - BACKGROUND
54 yo male w h/o CAD (2 stents 6-7 yrs ago), DM, HTN, HLD, former smoker, presented to outside hospital w abd pain. ALso found to have ST elevation on EKG.

## 2019-06-20 NOTE — ED PROVIDER NOTE - PMH
CAD (coronary artery disease)  s/p stent x2 in 2013  Diabetes    HLD (hyperlipidemia)    HTN (hypertension)

## 2019-06-20 NOTE — H&P ADULT - PROBLEM SELECTOR PLAN 7
poorly controlled DM with elevated A1C  Endocrine consult  continue long / short acting meds  monitor closely on heparin drip ( in D5)   discussed with pt re diet and weight loss

## 2019-06-20 NOTE — H&P ADULT - PROBLEM SELECTOR PLAN 1
appreciated cardiology consult  pt is on heparin drip for now  echo ordered, pending  continue aspirin, statin  ? not on Beta blocker  tele monitoring  supportive care  further mgmt per cardio

## 2019-06-20 NOTE — CONSULT NOTE ADULT - ASSESSMENT
54 yo male w h/o CAD (2 stents 6-7 yrs ago), DM, HTN, HLD, former smoker, presented to outside hospital w abd pain. He was noted to have new biphasic t-waves in V2-V6 and transferred to [facility] for new EKG abnormalities.    - Pt has no symptoms of coronary ischemia. Unclear timeline of these EKG changes compared to an EKG from 2015.  - At outside hospital was given , Brilinta 180 and Heparin 5000u bolus.   - Check cardiac enzymes  - Check TTE  - Workup of abd pain per ED  - Please notify me if troponin is elevated or if any change in clinical status    Nehemiah Phelps MD  Cardiology Fellow  [phone]  All Cardiology service information can be found 24/7 on [email or web address], password: [email or web address] 54 yo male w h/o CAD (2 stents 6-7 yrs ago), DM, HTN, HLD, former smoker, presented to outside hospital w abd pain. He was noted to have new biphasic t-waves in V2-V6 and transferred to [facility] for new EKG abnormalities.    - Pt has no symptoms of coronary ischemia. Unclear timeline of these EKG changes compared to an EKG from 2015.  - At outside hospital was given , Brilinta 180 and Heparin 5000u bolus.   - Given that pt's acute complaint is non-cardiac and is being evaluated for abdominal pathology would only c/w ASA 81 at this time.  - Cardiac enzyme elevation may be due to demand ischemia from abdominal or  process. Trend cardiac enzymes.  - Check TTE  - Workup of abd pain per ED  - Please notify me if any change in clinical status    Nehemiah Phelps MD  Cardiology Fellow  [phone]  All Cardiology service information can be found 24/7 on [email or web address], password: [employer] 54 yo male w h/o CAD (2 stents 6-7 yrs ago), DM, HTN, HLD, former smoker, presented to outside hospital w abd pain. He was noted to have new biphasic t-waves in V2-V6 and transferred to [facility] for new EKG abnormalities.    - Pt has no symptoms of coronary ischemia. Unclear timeline of these EKG changes compared to an EKG from 2015.  - At outside hospital was given , Brilinta 180 and Heparin 5000u bolus.   - Given that pt's acute complaint is non-cardiac and is being evaluated for abdominal pathology would only c/w ASA 81 at this time, in the case pt may need surgical intervention.  - Cardiac enzyme elevation may be due to demand ischemia from abdominal or  process. Trend cardiac enzymes.  - Will obtain TTE this morning.   - Workup of abd pain per ED  - Please notify me if any change in clinical status    Nehemiah Phelps MD  Cardiology Fellow  [phone]  All Cardiology service information can be found 24/7 on [email or web address], password: [employer] 52 yo male w h/o CAD (2 stents 6-7 yrs ago), DM, HTN, HLD, former smoker  Presented to outside hospital w abd pain. He was noted to have new biphasic t-waves in V2-V6 and transferred to [facility] for evaluation of the EKG abnormalities.      REC:  - Pt has no symptoms of coronary ischemia. Unclear timeline of these EKG changes compared to an EKG from 2015.  - At outside hospital was given , Brilinta 180 and Heparin 5000u bolus.   - Given that pt's acute complaint is non-cardiac and is being evaluated for abdominal pathology would only continue ASA 81 mg qd at this time, in the case pt may need surgical intervention.  Would d/c Brilinta and would not continue heparin.  - Cardiac enzyme elevation may be due to demand ischemia from abdominal or  process; would trend cardiac enzymes.  - Will obtain TTE this morning.   - Workup of abd pain per ED  - Please notify me if any change in clinical status      Nehemiah Phelps MD  Cardiology Fellow  [phone]  All Cardiology service information can be found 24/7 on [email or web address], password: [email or web address] M.D.  Cardiology Attending, Consult Service  [phone] 54 yo male w h/o CAD (2 stents 6-7 yrs ago), DM, HTN, HLD, former smoker  Presented to outside hospital w abd pain. He was noted to have new biphasic t-waves in V2-V6 and transferred to [facility] for evaluation of the EKG abnormalities.      REC:  - Pt has no symptoms of coronary ischemia. Unclear timeline of these EKG changes compared to an EKG from 2015.  - At outside hospital was given , Brilinta 180 and Heparin 5000u bolus.   - Given that pt's acute complaint is non-cardiac and is being evaluated for abdominal pathology would only continue ASA 81 mg qd at this time, in the case pt may need surgical intervention.  Would d/c Brilinta and would not continue heparin.  - Cardiac enzyme elevation may be due to demand ischemia from abdominal or  process; would trend cardiac enzymes.  - Will obtain TTE this morning.   - repeat EKG in AM  - Workup of abd pain per ED  - Please notify me if any change in clinical status      Nehemiah Phelps MD  Cardiology Fellow  [phone]  All Cardiology service information can be found 24/7 on [email or web address], password: [email or web address] M.D.  Cardiology Attending, Consult Service  [phone]

## 2019-06-20 NOTE — CONSULT NOTE ADULT - SUBJECTIVE AND OBJECTIVE BOX
CHIEF COMPLAINT: LLQ abd pain x 1 day    HISTORY OF PRESENT ILLNESS: 54 yo male w h/o CAD (2 stents 6-7 yrs ago), DM, HTN, HLD, former smoker, presented to outside hospital w abd pain. He was noted to have new biphasic t-waves in V2-V6 and transferred to [facility] for new EKG abnormalities. Pt denies any recent chest pain or dyspnea at rest or with exertion. No palpitations, dizziness, peripheral edema. He reports normal stress test and echo within the past year. His chief complaint is LLQ abd pain that started yesterday and is associated with dysuria.       Allergies    No Known Allergies    Intolerances    	    MEDICATIONS:                  PAST MEDICAL & SURGICAL HISTORY:  CAD (coronary artery disease): s/p stent x2 in 2013  Diabetes  HLD (hyperlipidemia)  HTN (hypertension)  Stented coronary artery      FAMILY HISTORY: No h/o CAD      SOCIAL HISTORY:    Former smoker, quit 30 yrs ago (25 pack yr history). Social EtOH use. No drug use.      REVIEW OF SYSTEMS:  General: no fatigue/malaise, weight loss/gain.  Skin: no rashes.  Ophthalmologic: no blurred vision, no loss of vision. 	  ENT: no sore throat, rhinorrhea, sinus congestion.  Respiratory: no SOB, cough or wheeze.  Gastrointestinal:  LLQ abd pain, no N/V/D, no melena/hematemesis/hematochezia.  Genitourinary: dysuria  Musculoskeletal: no myalgias or arthralgias.  Neurological: no changes in vision or hearing, no lightheadedness/dizziness, no syncope/near syncope	  Psychiatric: no unusual stress/anxiety.   Hematology/Lymphatics: no unusual bleeding, bruising and no lymphadenopathy.  Endocrine: no unusual thirst.   All others negative except as stated above and in HPI.    PHYSICAL EXAM:  T(C): 36.7 (06-20-19 @ 04:57), Max: 36.7 (06-20-19 @ 04:57)  HR: 116 (06-20-19 @ 05:05) (114 - 116)  BP: 152/98 (06-20-19 @ 05:05) (148/99 - 152/98)  RR: 22 (06-20-19 @ 05:05) (18 - 22)  SpO2: 99% (06-20-19 @ 05:05) (98% - 99%)  Wt(kg): --  I&O's Summary      Appearance: Appears uncomfortable due to abd pain  HEENT:   Normal oral mucosa, PERRL, EOMI	  Lymphatic: No lymphadenopathy  Cardiovascular: Normal S1 S2, No JVD, No murmurs, No edema  Respiratory: Lungs clear to auscultation	  Psychiatry: A & O x 3, Mood & affect appropriate  Gastrointestinal:  Soft, Non-tender, + BS	  Skin: No rashes, No ecchymoses, No cyanosis	  Neurologic: Non-focal  Extremities: Normal range of motion, No clubbing, cyanosis or edema  Vascular: Peripheral pulses palpable 2+ bilaterally        LABS:	 	    CBC Full  -  ( 20 Jun 2019 05:17 )  WBC Count : 14.3 K/uL  Hemoglobin : 16.5 g/dL  Hematocrit : 47.5 %  Platelet Count - Automated : 264 K/uL  Mean Cell Volume : 86.2 fl  Mean Cell Hemoglobin : 30.0 pg  Mean Cell Hemoglobin Concentration : 34.9 gm/dL  Auto Neutrophil # : 10.7 K/uL  Auto Lymphocyte # : 2.3 K/uL  Auto Monocyte # : 1.1 K/uL  Auto Eosinophil # : 0.1 K/uL  Auto Basophil # : 0.1 K/uL  Auto Neutrophil % : 74.9 %  Auto Lymphocyte % : 16.0 %  Auto Monocyte % : 7.8 %  Auto Eosinophil % : 0.7 %  Auto Basophil % : 0.6 %            proBNP:   Lipid Profile:   HgA1c:   TSH:       CARDIAC MARKERS:            TELEMETRY: 	    ECG:  	Sinus  L axis deviation IVCD Biphasic t waves V2-V6 which are new compared to EKG in 2015  RADIOLOGY:  OTHER: 	    PREVIOUS DIAGNOSTIC TESTING:    [ ] Echocardiogram:   [ ]  Catheterization:  [ ] Stress Test: 54 yo M h/o CAD (2 stents 6-7 yrs ago), DM, HTN, HLD, former smoker.  Presented to outside hospital w abd pain.  He was noted to have new biphasic t-waves in V2-V6 and was transferred to [facility] for further evaluation of the EKG abnormalities. Pt denies any recent chest pain or dyspnea at rest or with exertion. No palpitations, dizziness, peripheral edema. He reports normal stress test and echo within the past year.   His chief complaint is LLQ abd pain that started yesterday and is associated with dysuria.       Allergies:  No Known Allergies      MEDICATIONS:  aspirin 81 mg oral delayed release capsule: 1 tab(s) orally once a day (20 Jun 2019 12:34)  Janumet 50 mg-1000 mg oral tablet: 1 tab(s) orally 2 times a day (20 Jun 2019 12:33)  Levemir:  (20 Jun 2019 12:35)  losartan:  (20 Jun 2019 12:33)  Norvasc 5 mg oral tablet: 1 tab(s) orally once a day (20 Jun 2019 12:33)  simvastatin 20 mg oral tablet: 1 tab(s) orally once a day (at bedtime) (20 Jun 2019 12:35)      PAST MEDICAL & SURGICAL HISTORY:  CAD (coronary artery disease): s/p stent x2 in 2013  Diabetes  HLD (hyperlipidemia)  HTN (hypertension)  Stented coronary artery    FAMILY HISTORY: No h/o CAD    SOCIAL HISTORY:  Former smoker, quit 30 yrs ago (25 pack yr history). Social EtOH use. No drug use.    REVIEW OF SYSTEMS:  General: no fatigue/malaise, weight loss/gain.  Skin: no rashes.  Ophthalmologic: no blurred vision, no loss of vision. 	  ENT: no sore throat, rhinorrhea, sinus congestion.  Respiratory: no SOB, cough or wheeze.  Gastrointestinal:  LLQ abd pain, no N/V/D, no melena/hematemesis/hematochezia.  Genitourinary: dysuria  Musculoskeletal: no myalgias or arthralgias.  Neurological: no changes in vision or hearing, no lightheadedness/dizziness, no syncope/near syncope	  Psychiatric: no unusual stress/anxiety.   Hematology/Lymphatics: no unusual bleeding, bruising and no lymphadenopathy.  Endocrine: no unusual thirst.   All others negative except as stated above and in HPI.    PHYSICAL EXAM:  T(C): 36.7 (06-20-19 @ 04:57), Max: 36.7 (06-20-19 @ 04:57)  HR: 116 (06-20-19 @ 05:05) (114 - 116)  BP: 152/98 (06-20-19 @ 05:05) (148/99 - 152/98)  RR: 22 (06-20-19 @ 05:05) (18 - 22)  SpO2: 99% (06-20-19 @ 05:05) (98% - 99%)    Appearance: Appears uncomfortable due to abd pain  HEENT:   Normal oral mucosa, PERRL, EOMI	  Lymphatic: No lymphadenopathy  Cardiovascular: Normal S1 S2, No JVD, No murmurs, No edema  Respiratory: Lungs clear to auscultation	  Psychiatry: A & O x 3, Mood & affect appropriate  Gastrointestinal:  Soft, Non-tender, + BS	  Skin: No rashes, No ecchymoses, No cyanosis	  Neurologic: Non-focal  Extremities: Normal range of motion, No clubbing, cyanosis or edema  Vascular: Peripheral pulses palpable 2+ bilaterally      LABS:	 	    CBC Full  -  ( 20 Jun 2019 05:17 )  WBC Count : 14.3 K/uL  Hemoglobin : 16.5 g/dL  Hematocrit : 47.5 %  Platelet Count - Automated : 264 K/uL  Mean Cell Volume : 86.2 fl  Mean Cell Hemoglobin : 30.0 pg  Mean Cell Hemoglobin Concentration : 34.9 gm/dL  Auto Neutrophil # : 10.7 K/uL  Auto Lymphocyte # : 2.3 K/uL  Auto Monocyte # : 1.1 K/uL  Auto Eosinophil # : 0.1 K/uL  Auto Basophil # : 0.1 K/uL  Auto Neutrophil % : 74.9 %  Auto Lymphocyte % : 16.0 %  Auto Monocyte % : 7.8 %  Auto Eosinophil % : 0.7 %  Auto Basophil % : 0.6 %      ECG:  	ST at 114 bpm.  LAD, IVCD Biphasic t waves V2-V6, new compared to EKG in 2015 54 yo M h/o CAD (2 stents 6-7 yrs ago), DM, HTN, HLD, former smoker.  Presented to outside hospital w abd pain.  He was noted to have new biphasic t-waves in V2-V6 and was transferred to [facility] for further evaluation of the EKG abnormalities. Pt denies any recent chest pain or dyspnea at rest or with exertion. No palpitations, dizziness, peripheral edema. He reports normal stress test and echo within the past year.   His chief complaint is LLQ abd pain that started yesterday and is associated with dysuria.       Allergies:  No Known Allergies      MEDICATIONS:  aspirin 81 mg oral delayed release capsule: 1 tab(s) orally once a day (20 Jun 2019 12:34)  Janumet 50 mg-1000 mg oral tablet: 1 tab(s) orally 2 times a day (20 Jun 2019 12:33)  Levemir:  (20 Jun 2019 12:35)  losartan:  (20 Jun 2019 12:33)  Norvasc 5 mg oral tablet: 1 tab(s) orally once a day (20 Jun 2019 12:33)  simvastatin 20 mg oral tablet: 1 tab(s) orally once a day (at bedtime) (20 Jun 2019 12:35)      PAST MEDICAL & SURGICAL HISTORY:  CAD (coronary artery disease): s/p stent x2 in 2013  Diabetes  HLD (hyperlipidemia)  HTN (hypertension)  Stented coronary artery    FAMILY HISTORY: No h/o CAD    SOCIAL HISTORY:  Former smoker, quit 30 yrs ago (25 pack yr history). Social EtOH use. No drug use.    REVIEW OF SYSTEMS:  General: no fatigue/malaise, weight loss/gain.  Skin: no rashes.  Ophthalmologic: no blurred vision, no loss of vision. 	  ENT: no sore throat, rhinorrhea, sinus congestion.  Respiratory: no SOB, cough or wheeze.  Gastrointestinal:  LLQ abd pain, no N/V/D, no melena/hematemesis/hematochezia.  Genitourinary: dysuria  Musculoskeletal: no myalgias or arthralgias.  Neurological: no changes in vision or hearing, no lightheadedness/dizziness, no syncope/near syncope	  Psychiatric: no unusual stress/anxiety.   Hematology/Lymphatics: no unusual bleeding, bruising and no lymphadenopathy.  Endocrine: no unusual thirst.   All others negative except as stated above and in HPI.    PHYSICAL EXAM:  T(C): 36.7 (06-20-19 @ 04:57), Max: 36.7 (06-20-19 @ 04:57)  HR: 116 (06-20-19 @ 05:05) (114 - 116)  BP: 152/98 (06-20-19 @ 05:05) (148/99 - 152/98)  RR: 22 (06-20-19 @ 05:05) (18 - 22)  SpO2: 99% (06-20-19 @ 05:05) (98% - 99%)    Appearance: Appears uncomfortable due to abd pain  HEENT:   Normal oral mucosa, PERRL, EOMI	  Lymphatic: No lymphadenopathy  Cardiovascular: Normal S1 S2, No JVD, No murmurs, No edema  Respiratory: Lungs clear to auscultation	  Psychiatry: A & O x 3, Mood & affect appropriate  Gastrointestinal:  Soft, Non-tender, + BS	  Skin: No rashes, No ecchymoses, No cyanosis	  Neurologic: Non-focal  Extremities: Normal range of motion, No clubbing, cyanosis or edema  Vascular: Peripheral pulses palpable 2+ bilaterally    ECG:  	ST at 114 bpm.  LAD, IVCD Biphasic t waves V2-V6, new compared to EKG in 2015    LABS:	 	    CBC Full  -  ( 20 Jun 2019 05:17 )  WBC Count : 14.3 K/uL  Hemoglobin : 16.5 g/dL  Hematocrit : 47.5 %  Platelet Count - Automated : 264 K/uL  Mean Cell Volume : 86.2 fl  Mean Cell Hemoglobin : 30.0 pg  Mean Cell Hemoglobin Concentration : 34.9 gm/dL  Auto Neutrophil # : 10.7 K/uL  Auto Lymphocyte # : 2.3 K/uL  Auto Monocyte # : 1.1 K/uL  Auto Eosinophil # : 0.1 K/uL  Auto Basophil # : 0.1 K/uL  Auto Neutrophil % : 74.9 %  Auto Lymphocyte % : 16.0 %  Auto Monocyte % : 7.8 %  Auto Eosinophil % : 0.7 %  Auto Basophil % : 0.6 %

## 2019-06-20 NOTE — ED ADULT NURSE NOTE - CHIEF COMPLAINT QUOTE
brought in by ems as a transfer from [facility] for cards evaluation  ekg changes and hypertensive

## 2019-06-20 NOTE — PROVIDER CONTACT NOTE (OTHER) - ASSESSMENT
pt is laying comfortably in bed with family at the bedside. pt is asymptomatic, denies chest pain, dizziness, palpitations or SOB. states "I feel fine"

## 2019-06-21 DIAGNOSIS — E78.1 PURE HYPERGLYCERIDEMIA: ICD-10-CM

## 2019-06-21 LAB
ALBUMIN SERPL ELPH-MCNC: 3.2 G/DL — LOW (ref 3.3–5)
ALP SERPL-CCNC: 80 U/L — SIGNIFICANT CHANGE UP (ref 40–120)
ALT FLD-CCNC: 20 U/L — SIGNIFICANT CHANGE UP (ref 10–45)
ANION GAP SERPL CALC-SCNC: 10 MMOL/L — SIGNIFICANT CHANGE UP (ref 5–17)
ANION GAP SERPL CALC-SCNC: 11 MMOL/L — SIGNIFICANT CHANGE UP (ref 5–17)
ANION GAP SERPL CALC-SCNC: 13 MMOL/L — SIGNIFICANT CHANGE UP (ref 5–17)
AST SERPL-CCNC: 16 U/L — SIGNIFICANT CHANGE UP (ref 10–40)
BASOPHILS # BLD AUTO: 0.04 K/UL — SIGNIFICANT CHANGE UP (ref 0–0.2)
BASOPHILS NFR BLD AUTO: 0.4 % — SIGNIFICANT CHANGE UP (ref 0–2)
BILIRUB SERPL-MCNC: 0.4 MG/DL — SIGNIFICANT CHANGE UP (ref 0.2–1.2)
BUN SERPL-MCNC: 32 MG/DL — HIGH (ref 7–23)
BUN SERPL-MCNC: 40 MG/DL — HIGH (ref 7–23)
BUN SERPL-MCNC: 42 MG/DL — HIGH (ref 7–23)
CALCIUM SERPL-MCNC: 6.3 MG/DL — CRITICAL LOW (ref 8.4–10.5)
CALCIUM SERPL-MCNC: 8.7 MG/DL — SIGNIFICANT CHANGE UP (ref 8.4–10.5)
CALCIUM SERPL-MCNC: 8.8 MG/DL — SIGNIFICANT CHANGE UP (ref 8.4–10.5)
CHLORIDE SERPL-SCNC: 100 MMOL/L — SIGNIFICANT CHANGE UP (ref 96–108)
CHLORIDE SERPL-SCNC: 104 MMOL/L — SIGNIFICANT CHANGE UP (ref 96–108)
CHLORIDE SERPL-SCNC: 116 MMOL/L — HIGH (ref 96–108)
CHOLEST SERPL-MCNC: 145 MG/DL — SIGNIFICANT CHANGE UP (ref 10–199)
CK MB BLD-MCNC: 1.2 % — SIGNIFICANT CHANGE UP (ref 0–3.5)
CK MB CFR SERPL CALC: 2.6 NG/ML — SIGNIFICANT CHANGE UP (ref 0–6.7)
CK SERPL-CCNC: 225 U/L — HIGH (ref 30–200)
CO2 SERPL-SCNC: 19 MMOL/L — LOW (ref 22–31)
CO2 SERPL-SCNC: 22 MMOL/L — SIGNIFICANT CHANGE UP (ref 22–31)
CO2 SERPL-SCNC: 24 MMOL/L — SIGNIFICANT CHANGE UP (ref 22–31)
CREAT SERPL-MCNC: 1.21 MG/DL — SIGNIFICANT CHANGE UP (ref 0.5–1.3)
CREAT SERPL-MCNC: 1.63 MG/DL — HIGH (ref 0.5–1.3)
CREAT SERPL-MCNC: 2.43 MG/DL — HIGH (ref 0.5–1.3)
CULTURE RESULTS: NO GROWTH — SIGNIFICANT CHANGE UP
EOSINOPHIL # BLD AUTO: 0.45 K/UL — SIGNIFICANT CHANGE UP (ref 0–0.5)
EOSINOPHIL NFR BLD AUTO: 4.1 % — SIGNIFICANT CHANGE UP (ref 0–6)
GLUCOSE BLDC GLUCOMTR-MCNC: 161 MG/DL — HIGH (ref 70–99)
GLUCOSE BLDC GLUCOMTR-MCNC: 198 MG/DL — HIGH (ref 70–99)
GLUCOSE BLDC GLUCOMTR-MCNC: 222 MG/DL — HIGH (ref 70–99)
GLUCOSE BLDC GLUCOMTR-MCNC: 229 MG/DL — HIGH (ref 70–99)
GLUCOSE SERPL-MCNC: 111 MG/DL — HIGH (ref 70–99)
GLUCOSE SERPL-MCNC: 175 MG/DL — HIGH (ref 70–99)
GLUCOSE SERPL-MCNC: 202 MG/DL — HIGH (ref 70–99)
HCT VFR BLD CALC: 45 % — SIGNIFICANT CHANGE UP (ref 39–50)
HCV AB S/CO SERPL IA: 0.2 S/CO — SIGNIFICANT CHANGE UP (ref 0–0.99)
HCV AB SERPL-IMP: SIGNIFICANT CHANGE UP
HDLC SERPL-MCNC: 23 MG/DL — LOW
HGB BLD-MCNC: 15 G/DL — SIGNIFICANT CHANGE UP (ref 13–17)
IMM GRANULOCYTES NFR BLD AUTO: 0.3 % — SIGNIFICANT CHANGE UP (ref 0–1.5)
LACTATE SERPL-SCNC: 1.2 MMOL/L — SIGNIFICANT CHANGE UP (ref 0.7–2)
LIPID PNL WITH DIRECT LDL SERPL: SIGNIFICANT CHANGE UP MG/DL
LYMPHOCYTES # BLD AUTO: 2.84 K/UL — SIGNIFICANT CHANGE UP (ref 1–3.3)
LYMPHOCYTES # BLD AUTO: 25.9 % — SIGNIFICANT CHANGE UP (ref 13–44)
MAGNESIUM SERPL-MCNC: 2.5 MG/DL — SIGNIFICANT CHANGE UP (ref 1.6–2.6)
MCHC RBC-ENTMCNC: 29.3 PG — SIGNIFICANT CHANGE UP (ref 27–34)
MCHC RBC-ENTMCNC: 33.3 GM/DL — SIGNIFICANT CHANGE UP (ref 32–36)
MCV RBC AUTO: 87.9 FL — SIGNIFICANT CHANGE UP (ref 80–100)
MONOCYTES # BLD AUTO: 1.09 K/UL — HIGH (ref 0–0.9)
MONOCYTES NFR BLD AUTO: 9.9 % — SIGNIFICANT CHANGE UP (ref 2–14)
NEUTROPHILS # BLD AUTO: 6.52 K/UL — SIGNIFICANT CHANGE UP (ref 1.8–7.4)
NEUTROPHILS NFR BLD AUTO: 59.4 % — SIGNIFICANT CHANGE UP (ref 43–77)
PHOSPHATE SERPL-MCNC: 5.4 MG/DL — HIGH (ref 2.5–4.5)
PLATELET # BLD AUTO: 232 K/UL — SIGNIFICANT CHANGE UP (ref 150–400)
POTASSIUM SERPL-MCNC: 3 MMOL/L — LOW (ref 3.5–5.3)
POTASSIUM SERPL-MCNC: 3.9 MMOL/L — SIGNIFICANT CHANGE UP (ref 3.5–5.3)
POTASSIUM SERPL-MCNC: 3.9 MMOL/L — SIGNIFICANT CHANGE UP (ref 3.5–5.3)
POTASSIUM SERPL-SCNC: 3 MMOL/L — LOW (ref 3.5–5.3)
POTASSIUM SERPL-SCNC: 3.9 MMOL/L — SIGNIFICANT CHANGE UP (ref 3.5–5.3)
POTASSIUM SERPL-SCNC: 3.9 MMOL/L — SIGNIFICANT CHANGE UP (ref 3.5–5.3)
PROT SERPL-MCNC: 6.8 G/DL — SIGNIFICANT CHANGE UP (ref 6–8.3)
RBC # BLD: 5.12 M/UL — SIGNIFICANT CHANGE UP (ref 4.2–5.8)
RBC # FLD: 13.8 % — SIGNIFICANT CHANGE UP (ref 10.3–14.5)
SODIUM SERPL-SCNC: 135 MMOL/L — SIGNIFICANT CHANGE UP (ref 135–145)
SODIUM SERPL-SCNC: 139 MMOL/L — SIGNIFICANT CHANGE UP (ref 135–145)
SODIUM SERPL-SCNC: 145 MMOL/L — SIGNIFICANT CHANGE UP (ref 135–145)
SPECIMEN SOURCE: SIGNIFICANT CHANGE UP
TOTAL CHOLESTEROL/HDL RATIO MEASUREMENT: 6.3 RATIO — SIGNIFICANT CHANGE UP (ref 3.4–9.6)
TRIGL SERPL-MCNC: 430 MG/DL — HIGH (ref 10–149)
TROPONIN T, HIGH SENSITIVITY RESULT: 86 NG/L — HIGH (ref 0–51)
TSH SERPL-MCNC: 1.33 UIU/ML — SIGNIFICANT CHANGE UP (ref 0.27–4.2)
WBC # BLD: 10.97 K/UL — HIGH (ref 3.8–10.5)
WBC # FLD AUTO: 10.97 K/UL — HIGH (ref 3.8–10.5)

## 2019-06-21 PROCEDURE — 93306 TTE W/DOPPLER COMPLETE: CPT | Mod: 26

## 2019-06-21 PROCEDURE — 99233 SBSQ HOSP IP/OBS HIGH 50: CPT | Mod: GC

## 2019-06-21 PROCEDURE — 99233 SBSQ HOSP IP/OBS HIGH 50: CPT

## 2019-06-21 PROCEDURE — 93010 ELECTROCARDIOGRAM REPORT: CPT

## 2019-06-21 RX ORDER — LOSARTAN POTASSIUM 100 MG/1
25 TABLET, FILM COATED ORAL DAILY
Refills: 0 | Status: DISCONTINUED | OUTPATIENT
Start: 2019-06-21 | End: 2019-06-25

## 2019-06-21 RX ORDER — INSULIN LISPRO 100/ML
VIAL (ML) SUBCUTANEOUS AT BEDTIME
Refills: 0 | Status: DISCONTINUED | OUTPATIENT
Start: 2019-06-21 | End: 2019-06-29

## 2019-06-21 RX ORDER — INSULIN LISPRO 100/ML
7 VIAL (ML) SUBCUTANEOUS
Refills: 0 | Status: DISCONTINUED | OUTPATIENT
Start: 2019-06-21 | End: 2019-06-23

## 2019-06-21 RX ORDER — SODIUM CHLORIDE 9 MG/ML
1000 INJECTION INTRAMUSCULAR; INTRAVENOUS; SUBCUTANEOUS
Refills: 0 | Status: DISCONTINUED | OUTPATIENT
Start: 2019-06-21 | End: 2019-06-29

## 2019-06-21 RX ADMIN — HEPARIN SODIUM 5000 UNIT(S): 5000 INJECTION INTRAVENOUS; SUBCUTANEOUS at 05:23

## 2019-06-21 RX ADMIN — Medication 7 UNIT(S): at 16:39

## 2019-06-21 RX ADMIN — Medication 5 UNIT(S): at 11:58

## 2019-06-21 RX ADMIN — Medication 1: at 16:39

## 2019-06-21 RX ADMIN — HEPARIN SODIUM 5000 UNIT(S): 5000 INJECTION INTRAVENOUS; SUBCUTANEOUS at 21:42

## 2019-06-21 RX ADMIN — Medication 81 MILLIGRAM(S): at 11:58

## 2019-06-21 RX ADMIN — Medication 1 TABLET(S): at 11:58

## 2019-06-21 RX ADMIN — Medication 2: at 08:14

## 2019-06-21 RX ADMIN — SODIUM CHLORIDE 100 MILLILITER(S): 9 INJECTION INTRAMUSCULAR; INTRAVENOUS; SUBCUTANEOUS at 08:45

## 2019-06-21 RX ADMIN — Medication 2: at 11:59

## 2019-06-21 RX ADMIN — INSULIN GLARGINE 25 UNIT(S): 100 INJECTION, SOLUTION SUBCUTANEOUS at 21:45

## 2019-06-21 RX ADMIN — SIMVASTATIN 20 MILLIGRAM(S): 20 TABLET, FILM COATED ORAL at 21:42

## 2019-06-21 RX ADMIN — Medication 5 UNIT(S): at 08:14

## 2019-06-21 RX ADMIN — HEPARIN SODIUM 5000 UNIT(S): 5000 INJECTION INTRAVENOUS; SUBCUTANEOUS at 14:09

## 2019-06-21 NOTE — PROGRESS NOTE ADULT - NSHPATTENDINGPLANDISCUSS_GEN_ALL_CORE
Plan discussed with cardiology fellow, Dr. Lazaro; patient seen and examined.       I was physically present for the key portions of the evaluation and management (E/M) service provided.    I agree with the above history, physical, and plan which I have reviewed and edited where appropriate.

## 2019-06-21 NOTE — CHART NOTE - NSCHARTNOTEFT_GEN_A_CORE
Hypotension    Notified that patient was hypotensive manual BP 68/40  immediately assessed patient at the bedside and repeat BP left arm 101/67 right arm 84/54. Wife and children at the bedside. Patient denies chest pain, sob, cough, lightheadedness, dizziness palpitations, sob    Vital Signs Last 24 Hrs  T(C): 36.6 (21 Jun 2019 04:14), Max: 37.1 (20 Jun 2019 15:27)  T(F): 97.9 (21 Jun 2019 04:14), Max: 98.8 (20 Jun 2019 15:27)  HR: 91 (21 Jun 2019 04:14) (91 - 110)  BP: 95/65 (21 Jun 2019 04:14) (68/40 - 173/100)  BP(mean): --  RR: 20 (21 Jun 2019 04:14) (19 - 26)  SpO2: 96% (21 Jun 2019 04:14) (96% - 99%)    Discussed with Dr. Monique regarding blood pressure and cardiology consult updated plan    Plan  Monitor no bolus at this time  reorder amlodipine and cozaar with parameters  d/c heparin drip  heparin sq PPX  cards to follow    Will endorse to primary day team, attending to follow    Rebecca Stokes NP  [facility] [id number]

## 2019-06-21 NOTE — PROGRESS NOTE ADULT - SUBJECTIVE AND OBJECTIVE BOX
DIABETES FOLLOW UP NOTE: Saw pt earlier today  INTERVAL HX: 54 y/o M w/h/o uncontrolled T2DM  with variable adherence to meds (Basaglar 25 units q hs plus Janumet 50/1000 bid). Here with L lower back pain irradiating to L lower abdomen found to have kidney stone in bladder. Pt with FC to BSD. Reports tolerating POs with glycemic control above goal (>200s) while on present insulin doses. No hypoglycemia. Denies pain at time of visit. Also hypertriglyceridemia on statin. Creat up today.    Review of Systems:  General: As above  Cardiovascular: No chest pain, palpitations  Respiratory: No SOB, no cough  GI: No nausea, vomiting, abdominal pain  Endocrine: no polyuria, polydipsia or S&Sx of hypoglycemia    Allergies    No Known Allergies    Intolerances      MEDICATIONS:  insulin glargine Injectable (LANTUS) 25 Unit(s) SubCutaneous at bedtime  insulin lispro (HumaLOG) corrective regimen sliding scale   SubCutaneous three times a day before meals  insulin lispro Injectable (HumaLOG) 5 Unit(s) SubCutaneous three times a day before meals  simvastatin 20 milliGRAM(s) Oral at bedtime        PHYSICAL EXAM:  VITALS: T(C): 36.6 (06-21-19 @ 11:24)  T(F): 97.8 (06-21-19 @ 11:24), Max: 98.8 (06-20-19 @ 15:27)  HR: 90 (06-21-19 @ 11:24) (90 - 108)  BP: 101/68 (06-21-19 @ 11:24) (68/40 - 109/65)  RR:  (17 - 22)  SpO2:  (96% - 98%)  Wt(kg): --  GENERAL: Male laying in bed in NAD  Abdomen: Soft, nontender, non distended, obese  : FC to BSD with armen urine input noticing small clots in tubing  Extremities: Warm, no edema in all 4 exts  NEURO: A&O X3    LABS:  POCT Blood Glucose.: 222 mg/dL (06-21-19 @ 11:40)  POCT Blood Glucose.: 229 mg/dL (06-21-19 @ 07:51)  POCT Blood Glucose.: 202 mg/dL (06-20-19 @ 21:19)  POCT Blood Glucose.: 296 mg/dL (06-20-19 @ 16:35)  POCT Blood Glucose.: 273 mg/dL (06-20-19 @ 13:00)  POCT Blood Glucose.: 304 mg/dL (06-20-19 @ 10:18)  POCT Blood Glucose.: 324 mg/dL (06-20-19 @ 05:11)                            15.0   10.97 )-----------( 232      ( 21 Jun 2019 09:23 )             45.0       06-21    135  |  100  |  42<H>  ----------------------------<  202<H>  3.9   |  22  |  2.43<H>    EGFR if : 33<L>  EGFR if non : 29<L>    Ca    8.8      06-21  Mg     2.5     06-21  Phos  5.4     06-21    TPro  6.8  /  Alb  3.2<L>  /  TBili  0.4  /  DBili  x   /  AST  16  /  ALT  20  /  AlkPhos  80  06-21      Thyroid Function Tests:  06-21 @ 09:11 TSH 1.33 FreeT4 -- T3 -- Anti TPO -- Anti Thyroglobulin Ab -- TSI --      Hemoglobin A1C, Whole Blood: 11.5 % <H> [4.0 - 5.6] (06-20-19 @ 09:26)      06-21 Chol 145 LDL Unable to calculate Test Repeated  LDL Cholesterol (mg/dL) --- Interpretive Comment (for adults 18 and over)  Optimal LDL Level may vary based on clinical situation  Below 70                   Ideal for people at very high risk of heart  disease  Below 100                  Ideal for people at risk of heart disease  100 - 129                    [place]  130 - 159                    Borderline high  160 - 189                    High  190 and Above           Very high HDL 23<L> Trig 430<H>

## 2019-06-21 NOTE — PROGRESS NOTE ADULT - SUBJECTIVE AND OBJECTIVE BOX
Date of Admission:    24H hour events:     MEDICATIONS:  amLODIPine   Tablet 5 milliGRAM(s) Oral daily  aspirin enteric coated 81 milliGRAM(s) Oral daily  heparin  Injectable 5000 Unit(s) SubCutaneous every 8 hours  losartan 25 milliGRAM(s) Oral daily            dextrose 40% Gel 15 Gram(s) Oral once PRN  dextrose 50% Injectable 12.5 Gram(s) IV Push once  dextrose 50% Injectable 25 Gram(s) IV Push once  dextrose 50% Injectable 25 Gram(s) IV Push once  glucagon  Injectable 1 milliGRAM(s) IntraMuscular once PRN  insulin glargine Injectable (LANTUS) 25 Unit(s) SubCutaneous at bedtime  insulin lispro (HumaLOG) corrective regimen sliding scale   SubCutaneous three times a day before meals  insulin lispro Injectable (HumaLOG) 5 Unit(s) SubCutaneous three times a day before meals  simvastatin 20 milliGRAM(s) Oral at bedtime    dextrose 5%. 1000 milliLiter(s) IV Continuous <Continuous>  multivitamin 1 Tablet(s) Oral daily      REVIEW OF SYSTEMS:    CONSTITUTIONAL: No weakness, fevers or chills  EYES/ENT: No visual changes;  No dysphagia  RESPIRATORY: No cough, wheezing, hemoptysis; No shortness of breath  CARDIOVASCULAR: No chest pain or palpitations; No lower extremity edema  GASTROINTESTINAL: No abdominal or epigastric pain. No nausea, vomiting, or hematemesis  GENITOURINARY: No dysuria, frequency or hematuria  NEUROLOGICAL: No numbness or weakness  SKIN: No itching, burning, rashes, or lesions   HEME: No bleeding or bruising  MSK: No joint pains or muscle pains  All other review of systems is negative unless indicated above.    PHYSICAL EXAM:  T(C): 36.6 (06-21-19 @ 04:14), Max: 37.1 (06-20-19 @ 15:27)  HR: 91 (06-21-19 @ 04:14) (91 - 110)  BP: 95/65 (06-21-19 @ 04:14) (68/40 - 173/100)  RR: 20 (06-21-19 @ 04:14) (19 - 26)  SpO2: 96% (06-21-19 @ 04:14) (96% - 99%)  Wt(kg): --  I&O's Summary    20 Jun 2019 07:01  -  21 Jun 2019 07:00  --------------------------------------------------------  IN: 120 mL / OUT: 1800 mL / NET: -1680 mL        Appearance: Normal	  HEENT:   Normal oral mucosa  Cardiovascular: Normal S1 S2, No JVD, No murmurs, No edema  Respiratory: Lungs clear to auscultation	  Psychiatry: A & O x 3, Mood & affect appropriate  Gastrointestinal:  Soft, Non-tender, + BS	  Skin: No rashes, No ecchymoses, No cyanosis	  Neurologic: Non-focal  Extremities: Normal range of motion, No clubbing, cyanosis        LABS:	 	    CBC Full  -  ( 20 Jun 2019 15:40 )  WBC Count : 13.0 K/uL  Hemoglobin : 15.9 g/dL  Hematocrit : 47.0 %  Platelet Count - Automated : 243 K/uL  Mean Cell Volume : 86.5 fl  Mean Cell Hemoglobin : 29.2 pg  Mean Cell Hemoglobin Concentration : 33.8 gm/dL  Auto Neutrophil # : x  Auto Lymphocyte # : x  Auto Monocyte # : x  Auto Eosinophil # : x  Auto Basophil # : x  Auto Neutrophil % : x  Auto Lymphocyte % : x  Auto Monocyte % : x  Auto Eosinophil % : x  Auto Basophil % : x    06-21    135  |  100  |  42<H>  ----------------------------<  202<H>  3.9   |  22  |  2.43<H>  06-20    134<L>  |  98  |  32<H>  ----------------------------<  386<H>  4.9   |  20<L>  |  1.66<H>    Ca    8.8      21 Jun 2019 05:37  Ca    9.4      20 Jun 2019 05:17  Phos  5.4     06-21  Phos  2.7     06-20  Mg     2.5     06-21  Mg     2.0     06-20    TPro  6.8  /  Alb  3.2<L>  /  TBili  0.4  /  DBili  x   /  AST  16  /  ALT  20  /  AlkPhos  80  06-21  TPro  8.0  /  Alb  4.0  /  TBili  0.5  /  DBili  x   /  AST  23  /  ALT  25  /  AlkPhos  108  06-20  	  ASSESSMENT/PLAN: 	  54 yo male w h/o CAD (2 stents 6-7 yrs ago), DM, HTN, HLD, former smoker  Presented to outside hospital w abd pain. He was noted to have new biphasic t-waves in V2-V6 and transferred to [facility] for evaluation of the EKG abnormalities.      REC:  - Pt has no symptoms of coronary ischemia. Unclear timeline of these EKG changes compared to an EKG from 2015.  - Cardiac enzyme elevation may be due to demand ischemia from abdominal or  process - trop downtrended, no further trend  - TTE pending  - Workup of abd pain per primary team      Braden Lazaro MD  Cardiology Fellow, M-F 7:30A-5P  [phone]    All Cardiology service information can be found on [email or web address], password: [email or web address]. No events overnight. Denies CP, SOB, palp. Abdominal pain improved.      MEDICATIONS:  amLODIPine   Tablet 5 milliGRAM(s) Oral daily  aspirin enteric coated 81 milliGRAM(s) Oral daily  heparin  Injectable 5000 Unit(s) SubCutaneous every 8 hours  losartan 25 milliGRAM(s) Oral daily  dextrose 40% Gel 15 Gram(s) Oral once PRN  dextrose 50% Injectable 12.5 Gram(s) IV Push once  dextrose 50% Injectable 25 Gram(s) IV Push once  dextrose 50% Injectable 25 Gram(s) IV Push once  glucagon  Injectable 1 milliGRAM(s) IntraMuscular once PRN  insulin glargine Injectable (LANTUS) 25 Unit(s) SubCutaneous at bedtime  insulin lispro (HumaLOG) corrective regimen sliding scale   SubCutaneous three times a day before meals  insulin lispro Injectable (HumaLOG) 5 Unit(s) SubCutaneous three times a day before meals  simvastatin 20 milliGRAM(s) Oral at bedtime  dextrose 5%. 1000 milliLiter(s) IV Continuous <Continuous>  multivitamin 1 Tablet(s) Oral daily      REVIEW OF SYSTEMS:    CONSTITUTIONAL: No weakness, fevers or chills  EYES/ENT: No visual changes;  No dysphagia  RESPIRATORY: No cough, wheezing, hemoptysis; No shortness of breath  CARDIOVASCULAR: No chest pain or palpitations; No lower extremity edema  GASTROINTESTINAL: No abdominal or epigastric pain. No nausea, vomiting, or hematemesis  GENITOURINARY: No dysuria, frequency or hematuria  NEUROLOGICAL: No numbness or weakness  SKIN: No itching, burning, rashes, or lesions   HEME: No bleeding or bruising  MSK: No joint pains or muscle pains  All other review of systems is negative unless indicated above.    PHYSICAL EXAM:  T(C): 36.6 (06-21-19 @ 04:14), Max: 37.1 (06-20-19 @ 15:27)  HR: 91 (06-21-19 @ 04:14) (91 - 110)  BP: 95/65 (06-21-19 @ 04:14) (68/40 - 173/100)  RR: 20 (06-21-19 @ 04:14) (19 - 26)  SpO2: 96% (06-21-19 @ 04:14) (96% - 99%)  Wt(kg): --  I&O's Summary    20 Jun 2019 07:01  -  21 Jun 2019 07:00  --------------------------------------------------------  IN: 120 mL / OUT: 1800 mL / NET: -1680 mL        Appearance: Normal	  HEENT:   Normal oral mucosa  Cardiovascular: Normal S1 S2, No JVD, No murmurs, No edema  Respiratory: Lungs clear to auscultation	  Psychiatry: A & O x 3, Mood & affect appropriate  Gastrointestinal:  Soft, Non-tender, + BS	  Skin: No rashes, No ecchymoses, No cyanosis	  Neurologic: Non-focal  Extremities: Normal range of motion, No clubbing, cyanosis        LABS:	 	    CBC Full  -  ( 20 Jun 2019 15:40 )  WBC Count : 13.0 K/uL  Hemoglobin : 15.9 g/dL  Hematocrit : 47.0 %  Platelet Count - Automated : 243 K/uL  Mean Cell Volume : 86.5 fl  Mean Cell Hemoglobin : 29.2 pg  Mean Cell Hemoglobin Concentration : 33.8 gm/dL  Auto Neutrophil # : x  Auto Lymphocyte # : x  Auto Monocyte # : x  Auto Eosinophil # : x  Auto Basophil # : x  Auto Neutrophil % : x  Auto Lymphocyte % : x  Auto Monocyte % : x  Auto Eosinophil % : x  Auto Basophil % : x    06-21    135  |  100  |  42<H>  ----------------------------<  202<H>  3.9   |  22  |  2.43<H>  06-20    134<L>  |  98  |  32<H>  ----------------------------<  386<H>  4.9   |  20<L>  |  1.66<H>    Ca    8.8      21 Jun 2019 05:37  Ca    9.4      20 Jun 2019 05:17  Phos  5.4     06-21  Phos  2.7     06-20  Mg     2.5     06-21  Mg     2.0     06-20    TPro  6.8  /  Alb  3.2<L>  /  TBili  0.4  /  DBili  x   /  AST  16  /  ALT  20  /  AlkPhos  80  06-21  TPro  8.0  /  Alb  4.0  /  TBili  0.5  /  DBili  x   /  AST  23  /  ALT  25  /  AlkPhos  108  06-20  	  ASSESSMENT/PLAN: 	  52 yo male w h/o CAD (2 stents 6-7 yrs ago), DM, HTN, HLD, former smoker  Presented to outside hospital w abd pain. He was noted to have new biphasic t-waves in V2-V6 and transferred to [facility] for evaluation of the EKG abnormalities.      REC:  - Pt has no symptoms of coronary ischemia. Unclear timeline of these EKG changes compared to an EKG from 2015.  - Cardiac enzyme elevation may be due to demand ischemia from abdominal or  process - trop downtrended, no further trend  - TTE pending  - noted to have differential BP in bilateral arms  - Workup of abd pain per primary team      Braden Lazaro MD  Cardiology Fellow, M-F 7:30A-5P  [phone]    All Cardiology service information can be found on [email or web address], password: [email or web address]. No events overnight. Denies CP, SOB, palp. Abdominal pain improved.      MEDICATIONS:  amLODIPine   Tablet 5 milliGRAM(s) Oral daily  aspirin enteric coated 81 milliGRAM(s) Oral daily  heparin  Injectable 5000 Unit(s) SubCutaneous every 8 hours  losartan 25 milliGRAM(s) Oral daily  dextrose 40% Gel 15 Gram(s) Oral once PRN  dextrose 50% Injectable 12.5 Gram(s) IV Push once  dextrose 50% Injectable 25 Gram(s) IV Push once  dextrose 50% Injectable 25 Gram(s) IV Push once  glucagon  Injectable 1 milliGRAM(s) IntraMuscular once PRN  insulin glargine Injectable (LANTUS) 25 Unit(s) SubCutaneous at bedtime  insulin lispro (HumaLOG) corrective regimen sliding scale   SubCutaneous three times a day before meals  insulin lispro Injectable (HumaLOG) 5 Unit(s) SubCutaneous three times a day before meals  simvastatin 20 milliGRAM(s) Oral at bedtime  dextrose 5%. 1000 milliLiter(s) IV Continuous <Continuous>  multivitamin 1 Tablet(s) Oral daily      REVIEW OF SYSTEMS:    CONSTITUTIONAL: No weakness, fevers or chills  EYES/ENT: No visual changes;  No dysphagia  RESPIRATORY: No cough, wheezing, hemoptysis; No shortness of breath  CARDIOVASCULAR: No chest pain or palpitations; No lower extremity edema  GASTROINTESTINAL: No abdominal or epigastric pain. No nausea, vomiting, or hematemesis  GENITOURINARY: No dysuria, frequency or hematuria  NEUROLOGICAL: No numbness or weakness  SKIN: No itching, burning, rashes, or lesions   HEME: No bleeding or bruising  MSK: No joint pains or muscle pains  All other review of systems is negative unless indicated above.    PHYSICAL EXAM:  T(C): 36.6 (06-21-19 @ 04:14), Max: 37.1 (06-20-19 @ 15:27)  HR: 91 (06-21-19 @ 04:14) (91 - 110)  BP: 95/65 (06-21-19 @ 04:14) (68/40 - 173/100)  RR: 20 (06-21-19 @ 04:14) (19 - 26)  SpO2: 96% (06-21-19 @ 04:14) (96% - 99%)  Wt(kg): --  I&O's Summary    20 Jun 2019 07:01  -  21 Jun 2019 07:00  --------------------------------------------------------  IN: 120 mL / OUT: 1800 mL / NET: -1680 mL        Appearance: Normal	  HEENT:   Normal oral mucosa  Cardiovascular: Normal S1 S2, No JVD, No murmurs, No edema  Respiratory: Lungs clear to auscultation	  Psychiatry: A & O x 3, Mood & affect appropriate  Gastrointestinal:  Soft, Non-tender, + BS	  Skin: No rashes, No ecchymoses, No cyanosis	  Neurologic: Non-focal  Extremities: Normal range of motion, No clubbing, cyanosis        LABS:	 	    CBC Full  -  ( 20 Jun 2019 15:40 )  WBC Count : 13.0 K/uL  Hemoglobin : 15.9 g/dL  Hematocrit : 47.0 %  Platelet Count - Automated : 243 K/uL  Mean Cell Volume : 86.5 fl  Mean Cell Hemoglobin : 29.2 pg  Mean Cell Hemoglobin Concentration : 33.8 gm/dL  Auto Neutrophil # : x  Auto Lymphocyte # : x  Auto Monocyte # : x  Auto Eosinophil # : x  Auto Basophil # : x  Auto Neutrophil % : x  Auto Lymphocyte % : x  Auto Monocyte % : x  Auto Eosinophil % : x  Auto Basophil % : x    06-21    135  |  100  |  42<H>  ----------------------------<  202<H>  3.9   |  22  |  2.43<H>  06-20    134<L>  |  98  |  32<H>  ----------------------------<  386<H>  4.9   |  20<L>  |  1.66<H>    Ca    8.8      21 Jun 2019 05:37  Ca    9.4      20 Jun 2019 05:17  Phos  5.4     06-21  Phos  2.7     06-20  Mg     2.5     06-21  Mg     2.0     06-20    TPro  6.8  /  Alb  3.2<L>  /  TBili  0.4  /  DBili  x   /  AST  16  /  ALT  20  /  AlkPhos  80  06-21  TPro  8.0  /  Alb  4.0  /  TBili  0.5  /  DBili  x   /  AST  23  /  ALT  25  /  AlkPhos  108  06-20  	  ASSESSMENT/PLAN: 	  52 yo male w h/o CAD (2 stents 6-7 yrs ago), DM, HTN, HLD, former smoker  Presented to outside hospital w abd pain. He was noted to have new biphasic t-waves in V2-V6 and transferred to [facility] for evaluation of the EKG abnormalities.      REC:  - Pt has no symptoms of coronary ischemia. Unclear timeline of these EKG changes compared to an EKG from 2015.  - Cardiac enzyme elevation may be due to demand ischemia from abdominal or  process - trop downtrended, no further trend  - TTE pending  - noted to have differential BP in bilateral arms overnight but morning BP stable in both arms  - Workup of abd pain per primary team      Braden Lazaro MD  Cardiology Fellow, M-F 7:30A-5P  [phone]    All Cardiology service information can be found on [email or web address], password: [email or web address]. No events overnight. Denies CP, SOB, palp. Abdominal pain improved.      MEDICATIONS:  amLODIPine   Tablet 5 milliGRAM(s) Oral daily  aspirin enteric coated 81 milliGRAM(s) Oral daily  heparin  Injectable 5000 Unit(s) SubCutaneous every 8 hours  losartan 25 milliGRAM(s) Oral daily  dextrose 40% Gel 15 Gram(s) Oral once PRN  dextrose 50% Injectable 12.5 Gram(s) IV Push once  dextrose 50% Injectable 25 Gram(s) IV Push once  dextrose 50% Injectable 25 Gram(s) IV Push once  glucagon  Injectable 1 milliGRAM(s) IntraMuscular once PRN  insulin glargine Injectable (LANTUS) 25 Unit(s) SubCutaneous at bedtime  insulin lispro (HumaLOG) corrective regimen sliding scale   SubCutaneous three times a day before meals  insulin lispro Injectable (HumaLOG) 5 Unit(s) SubCutaneous three times a day before meals  simvastatin 20 milliGRAM(s) Oral at bedtime  dextrose 5%. 1000 milliLiter(s) IV Continuous <Continuous>  multivitamin 1 Tablet(s) Oral daily      REVIEW OF SYSTEMS:  CONSTITUTIONAL: No weakness, fevers or chills  EYES/ENT: No visual changes;  No dysphagia  RESPIRATORY: No cough, wheezing, hemoptysis; No shortness of breath  CARDIOVASCULAR: No chest pain or palpitations; No lower extremity edema  GASTROINTESTINAL: No abdominal or epigastric pain. No nausea, vomiting, or hematemesis  GENITOURINARY: No dysuria, frequency or hematuria  NEUROLOGICAL: No numbness or weakness  SKIN: No itching, burning, rashes, or lesions   HEME: No bleeding or bruising  MSK: No joint pains or muscle pains  All other review of systems is negative unless indicated above.    PHYSICAL EXAM:  T(C): 36.6 (06-21-19 @ 04:14), Max: 37.1 (06-20-19 @ 15:27)  HR: 91 (06-21-19 @ 04:14) (91 - 110)  BP: 95/65 (06-21-19 @ 04:14) (68/40 - 173/100)  RR: 20 (06-21-19 @ 04:14) (19 - 26)  SpO2: 96% (06-21-19 @ 04:14) (96% - 99%)    Appearance: Normal	  HEENT:   Normal oral mucosa  Cardiovascular: Normal S1 S2, No JVD, No murmurs, No edema  Respiratory: Lungs clear to auscultation	  Psychiatry: A & O x 3, Mood & affect appropriate  Gastrointestinal:  Soft, Non-tender, + BS	  Skin: No rashes, No ecchymoses, No cyanosis	  Neurologic: Non-focal  Extremities: Normal range of motion, No clubbing, cyanosis    I&O's Summary    20 Jun 2019 07:01  -  21 Jun 2019 07:00  --------------------------------------------------------  IN: 120 mL / OUT: 1800 mL / NET: -1680 mL      LABS:	 	  CBC Full  -  ( 20 Jun 2019 15:40 )  WBC Count : 13.0 K/uL  Hemoglobin : 15.9 g/dL  Hematocrit : 47.0 %  Platelet Count - Automated : 243 K/uL    06-21  135  |  100  |  42<H>  ----------------------------<  202<H>  3.9   |  22  |  2.43<H>    06-20  134<L>  |  98  |  32<H>  ----------------------------<  386<H>  4.9   |  20<L>  |  1.66<H>    Ca    8.8      21 Jun 2019 05:37  Ca    9.4      20 Jun 2019 05:17    Phos  5.4     06-21  Phos  2.7     06-20    Mg     2.5     06-21  Mg     2.0     06-20    TPro  6.8  /  Alb  3.2<L>  /  TBili  0.4  /  DBili  x   /  AST  16  /  ALT  20  /  AlkPhos  80  06-21  TPro  8.0  /  Alb  4.0  /  TBili  0.5  /  DBili  x   /  AST  23  /  ALT  25  /  AlkPhos  108  06-20    	  ASSESSMENT/PLAN: 	  52 yo male w h/o CAD (2 stents 6-7 yrs ago), DM, HTN, HLD, former smoker  Presented to outside hospital w abd pain. He was noted to have new biphasic t-waves in V2-V6 and transferred to [facility] for evaluation of the EKG abnormalities.      REC:  - Pt has no symptoms of coronary ischemia. Unclear timeline of these EKG changes compared to an EKG from 2015.  - Cardiac enzyme elevation may be due to demand ischemia from abdominal or  process - trop downtrended, no further trend  - TTE pending  - noted to have differential BP in bilateral arms overnight but morning BP stable in both arms  - Treatment of nephrolithiasis per primary team      Braden Lazaro MD  Cardiology Fellow, M-F 7:30A-5P  [phone]    All Cardiology service information can be found on [email or web address], password: [email or web address].

## 2019-06-22 LAB
ALBUMIN SERPL ELPH-MCNC: 3.2 G/DL — LOW (ref 3.3–5)
ALP SERPL-CCNC: 92 U/L — SIGNIFICANT CHANGE UP (ref 40–120)
ALT FLD-CCNC: 20 U/L — SIGNIFICANT CHANGE UP (ref 10–45)
ANION GAP SERPL CALC-SCNC: 10 MMOL/L — SIGNIFICANT CHANGE UP (ref 5–17)
AST SERPL-CCNC: 20 U/L — SIGNIFICANT CHANGE UP (ref 10–40)
BASOPHILS # BLD AUTO: 0.04 K/UL — SIGNIFICANT CHANGE UP (ref 0–0.2)
BASOPHILS NFR BLD AUTO: 0.5 % — SIGNIFICANT CHANGE UP (ref 0–2)
BILIRUB SERPL-MCNC: 0.3 MG/DL — SIGNIFICANT CHANGE UP (ref 0.2–1.2)
BUN SERPL-MCNC: 32 MG/DL — HIGH (ref 7–23)
CALCIUM SERPL-MCNC: 8.5 MG/DL — SIGNIFICANT CHANGE UP (ref 8.4–10.5)
CHLORIDE SERPL-SCNC: 106 MMOL/L — SIGNIFICANT CHANGE UP (ref 96–108)
CK SERPL-CCNC: 174 U/L — SIGNIFICANT CHANGE UP (ref 30–200)
CO2 SERPL-SCNC: 22 MMOL/L — SIGNIFICANT CHANGE UP (ref 22–31)
CREAT SERPL-MCNC: 1.2 MG/DL — SIGNIFICANT CHANGE UP (ref 0.5–1.3)
EOSINOPHIL # BLD AUTO: 0.57 K/UL — HIGH (ref 0–0.5)
EOSINOPHIL NFR BLD AUTO: 6.9 % — HIGH (ref 0–6)
GLUCOSE BLDC GLUCOMTR-MCNC: 139 MG/DL — HIGH (ref 70–99)
GLUCOSE BLDC GLUCOMTR-MCNC: 186 MG/DL — HIGH (ref 70–99)
GLUCOSE BLDC GLUCOMTR-MCNC: 224 MG/DL — HIGH (ref 70–99)
GLUCOSE BLDC GLUCOMTR-MCNC: 233 MG/DL — HIGH (ref 70–99)
GLUCOSE SERPL-MCNC: 185 MG/DL — HIGH (ref 70–99)
HCT VFR BLD CALC: 44.8 % — SIGNIFICANT CHANGE UP (ref 39–50)
HGB BLD-MCNC: 14.3 G/DL — SIGNIFICANT CHANGE UP (ref 13–17)
IMM GRANULOCYTES NFR BLD AUTO: 0.4 % — SIGNIFICANT CHANGE UP (ref 0–1.5)
LYMPHOCYTES # BLD AUTO: 2.34 K/UL — SIGNIFICANT CHANGE UP (ref 1–3.3)
LYMPHOCYTES # BLD AUTO: 28.4 % — SIGNIFICANT CHANGE UP (ref 13–44)
MCHC RBC-ENTMCNC: 28.5 PG — SIGNIFICANT CHANGE UP (ref 27–34)
MCHC RBC-ENTMCNC: 31.9 GM/DL — LOW (ref 32–36)
MCV RBC AUTO: 89.4 FL — SIGNIFICANT CHANGE UP (ref 80–100)
MONOCYTES # BLD AUTO: 0.81 K/UL — SIGNIFICANT CHANGE UP (ref 0–0.9)
MONOCYTES NFR BLD AUTO: 9.8 % — SIGNIFICANT CHANGE UP (ref 2–14)
NEUTROPHILS # BLD AUTO: 4.44 K/UL — SIGNIFICANT CHANGE UP (ref 1.8–7.4)
NEUTROPHILS NFR BLD AUTO: 54 % — SIGNIFICANT CHANGE UP (ref 43–77)
PLATELET # BLD AUTO: 221 K/UL — SIGNIFICANT CHANGE UP (ref 150–400)
POTASSIUM SERPL-MCNC: 4.2 MMOL/L — SIGNIFICANT CHANGE UP (ref 3.5–5.3)
POTASSIUM SERPL-SCNC: 4.2 MMOL/L — SIGNIFICANT CHANGE UP (ref 3.5–5.3)
PROT SERPL-MCNC: 6.9 G/DL — SIGNIFICANT CHANGE UP (ref 6–8.3)
RBC # BLD: 5.01 M/UL — SIGNIFICANT CHANGE UP (ref 4.2–5.8)
RBC # FLD: 13.6 % — SIGNIFICANT CHANGE UP (ref 10.3–14.5)
SODIUM SERPL-SCNC: 138 MMOL/L — SIGNIFICANT CHANGE UP (ref 135–145)
WBC # BLD: 8.23 K/UL — SIGNIFICANT CHANGE UP (ref 3.8–10.5)
WBC # FLD AUTO: 8.23 K/UL — SIGNIFICANT CHANGE UP (ref 3.8–10.5)

## 2019-06-22 RX ORDER — SIMVASTATIN 20 MG/1
20 TABLET, FILM COATED ORAL AT BEDTIME
Refills: 0 | Status: DISCONTINUED | OUTPATIENT
Start: 2019-06-22 | End: 2019-06-29

## 2019-06-22 RX ORDER — SIMVASTATIN 20 MG/1
40 TABLET, FILM COATED ORAL AT BEDTIME
Refills: 0 | Status: DISCONTINUED | OUTPATIENT
Start: 2019-06-22 | End: 2019-06-22

## 2019-06-22 RX ADMIN — Medication 81 MILLIGRAM(S): at 12:39

## 2019-06-22 RX ADMIN — HEPARIN SODIUM 5000 UNIT(S): 5000 INJECTION INTRAVENOUS; SUBCUTANEOUS at 22:15

## 2019-06-22 RX ADMIN — SIMVASTATIN 20 MILLIGRAM(S): 20 TABLET, FILM COATED ORAL at 22:15

## 2019-06-22 RX ADMIN — INSULIN GLARGINE 25 UNIT(S): 100 INJECTION, SOLUTION SUBCUTANEOUS at 22:16

## 2019-06-22 RX ADMIN — LOSARTAN POTASSIUM 25 MILLIGRAM(S): 100 TABLET, FILM COATED ORAL at 05:42

## 2019-06-22 RX ADMIN — HEPARIN SODIUM 5000 UNIT(S): 5000 INJECTION INTRAVENOUS; SUBCUTANEOUS at 05:42

## 2019-06-22 RX ADMIN — Medication 1 TABLET(S): at 12:39

## 2019-06-22 RX ADMIN — Medication 7 UNIT(S): at 12:41

## 2019-06-22 RX ADMIN — HEPARIN SODIUM 5000 UNIT(S): 5000 INJECTION INTRAVENOUS; SUBCUTANEOUS at 13:21

## 2019-06-22 RX ADMIN — AMLODIPINE BESYLATE 5 MILLIGRAM(S): 2.5 TABLET ORAL at 05:42

## 2019-06-22 RX ADMIN — Medication 7 UNIT(S): at 17:00

## 2019-06-22 RX ADMIN — Medication 2: at 12:40

## 2019-06-22 RX ADMIN — Medication 7 UNIT(S): at 08:34

## 2019-06-22 RX ADMIN — Medication 2: at 16:59

## 2019-06-22 RX ADMIN — Medication 1: at 08:33

## 2019-06-22 NOTE — DIETITIAN INITIAL EVALUATION ADULT. - NUTRITION INTERVENTION
Patient Instructions by Meryl Tavarez CMA at 03/15/18 09:00 AM     Author:  Meryl Tavarez CMA Service:  (none) Author Type:  Certified Medical Assistant     Filed:  03/15/18 09:00 AM Encounter Date:  3/14/2018 Status:  Signed     :  Meryl Tavarez CMA (Certified Medical Assistant)            Additional phones numbers: Physical Therapy   Physical Therapy Location     [facility]   [address], [place]  [address] [address]  [address] [address],  [place]  [address] [address]  [address] [address] [place]  [address]          [phone]            · Occupational Therapy (Hand/Upper extremity therapy) is offered at the [facility]  · Please dress according to treatment area (ex: knee treatment needs to wear shorts)  · Co-payments are due at the time of appointment  · To ensure your visit goes as smooth as possible, please check your insurance benefits for coverage of physical therapy and bring a copy of your insurance card with you.        [facility] Hours:  Monday - Friday from 8:00 a.m. to 5:00 p.m.  Saturday from 8:00 a.m. to 12:00 p.m.    Additional Educational Resources:  For additional resources regarding your symptoms, diagnosis, or further health information, please visit the Health Resources section on [email or web address] or the Online Health Resources section in [employer].          Revision History        User Key Date/Time User Provider Type Action    > [N/A] 03/15/18 09:00 AM Meryl Tavarez CMA Certified Medical Assistant Sign             Collaboration and Referral of Nutrition Care/Meals and Snack/Nutrition Education

## 2019-06-22 NOTE — DIETITIAN INITIAL EVALUATION ADULT. - ENERGY NEEDS
ht: 67 inches. wt: 205.5 pounds (current, standing, no edema noted). BMI: 32.2 kG/m2. UBW:  IBW: 148 pounds +/- 10%. %IBW: 139%  Other pertinent objective information: 53 year old male pt with PMH CAD (2 stents 6-7 yrs ago), T2DM (Janumet + Basaglar PTA), HTN, HLD, former smoker, presented to [facility] with c/o lower abd pain also found to have abnormal EKG and elevated Troponin level. Echo pending. Pt noted with HbA1c 11.5%. Seen by Endo who noted d/c plan of basal/bolus insulin, FS checking before meals and before bed with glucose goal 100-150. No pressure injuries per flowsheet records. ht: 67 inches. wt: 205.5 pounds (current, standing, no edema noted). BMI: 32.2 kG/m2. UBW: 205 pounds as recent; 180 pounds x ~25 years ago. IBW: 148 pounds +/- 10%. %IBW: 139%  Other pertinent objective information: 53 year old male pt with PMH CAD (2 stents 6-7 yrs ago), T2DM (Janumet + Basaglar PTA), HTN, HLD, former smoker, presented to [facility] with c/o lower abd pain also found to have abnormal EKG and elevated Troponin level. Echo pending. Pt noted with HbA1c 11.5%. Seen by Endo who noted d/c plan of basal/bolus insulin, FS checking before meals and before bed with glucose goal 100-150. No pressure injuries per flowsheet records.

## 2019-06-22 NOTE — DIETITIAN INITIAL EVALUATION ADULT. - OTHER INFO
Pt seen for consult: nutrition education. Pt seen for consult: nutrition education. Pt currently reports good po intake/appetite at this time, denies GI distress no N+V, diarrhea or constipation. Denies chewing/swallowing difficulty. Pt reports weight gain over the past 25 years, states he used to be 180 pounds but has gradually gained to current weight of 205 pounds 2/2 sedentary lifestyle and eating large meals. In terms of DM management PTA, pt was on Janumet and Basaglar, admits to nonadherence at times. Pt owns a glucometer and checks fasting FS daily, results were 280 for fasting PTA. Pt admits to generally unrestricted diet, aware he should limit sodas and sweets but is unable to list all CHO-containing foods. Is amenable to discuss recommendations at this time.

## 2019-06-22 NOTE — DIETITIAN INITIAL EVALUATION ADULT. - NS AS NUTRI INTERV ED CONTENT
Nutrition relationship to health/disease/Purpose of the nutrition education/Recommended modifications/Educated pt on T2DM diet/management, wt loss, and heart healthy diet recommendations. Discussed SMBG, general goal FS ranges (100-150 per endo note), hypoglycemia s/s/treatment and prevention, medication adherence, consistent CHO diet, appropriate CHO serving sizes, recommended CHO servings per meals, adequate intake of lean protein and non-starchy vegetables, mixed macronutrient meals, physical activity as tolerated and per MD discretion, EtOH per MD discretion, wt loss 5-10% of body weight to promote overall improved metabolic profile, 1800 kcal recommendations daily, avoiding sugar-sweetened beverages, suitable alternatives, weight loss programs and apps to utilize. Pt voiced understanding and exhibits good motivation to adhere to recommendations. Accepted T2DM nutrition therapy handout; Heart-healthy nutrition therapy handout, Low Sodium nutrition therpay handout, Heart healthy nutrition therapy handout. Purpose of the nutrition education/Nutrition relationship to health/disease/Recommended modifications/Educated pt on T2DM diet/management, wt loss, and heart healthy diet recommendations. Discussed SMBG, general goal FS ranges (100-150 per endo note), hypoglycemia s/s/treatment and prevention, medication adherence, consistent CHO diet, appropriate CHO serving sizes, recommended CHO servings per meals, adequate intake of lean protein and non-starchy vegetables, mixed macronutrient meals, physical activity as tolerated and per MD discretion, EtOH per MD discretion and relationship of EtOH to blood sugar, wt loss 5-10% of body weight to promote overall improved metabolic profile, 1800 kcal recommendations daily, avoiding sugar-sweetened beverages, suitable alternatives, weight loss programs and apps to utilize. Pt voiced understanding and exhibits good motivation to adhere to recommendations. Accepted T2DM nutrition therapy handout; Heart-healthy nutrition therapy handout, Low Sodium nutrition therapy handout, 1800 kcal nutrition therapy handout.

## 2019-06-22 NOTE — DIETITIAN INITIAL EVALUATION ADULT. - ADHERENCE
Pt admits to liberal diet PTA, per diet recall pt consuming sugar-sweetened beverages, suspect kcal and CHO intake in excess/poor

## 2019-06-22 NOTE — DIETITIAN INITIAL EVALUATION ADULT. - ORAL INTAKE PTA
Pt reports good po intake/appetite PTA. Typical meal intake includes: 2 eggs with 2 slices of bread and tea. Lunch: small piece of chicken with Gatorade or vitamin water. Dinner: Large meal with large portion of rice, roti, vargas chicken or fish and tea or water. Pt drinks EtOH socially 4-5 drinks per outing. Denies taking vitamin/mineral/herbal supplements PTA./good

## 2019-06-22 NOTE — DIETITIAN INITIAL EVALUATION ADULT. - PROBLEM SELECTOR PLAN 4
per CT scan likely due to passed stone ( stone seen in bladder)   monitor renal function ( unclear if pt with MARTIN here of CKD at baseline)  encourage PO hydration

## 2019-06-22 NOTE — DIETITIAN INITIAL EVALUATION ADULT. - NS AS NUTRI INTERV MEALS SNACK
Carbohydrate - modified diet/Energy - modified diet/Mineral - modified diet/General/healthful diet/Recommend continue current DASH consistent CHO diet order to promote glycemic control and heart health. 1800 kcal diet restriction recommended to promote wt loss.

## 2019-06-23 LAB
APTT BLD: 100.8 SEC — HIGH (ref 27.5–36.3)
APTT BLD: >200 SEC — CRITICAL HIGH (ref 27.5–36.3)
GLUCOSE BLDC GLUCOMTR-MCNC: 123 MG/DL — HIGH (ref 70–99)
GLUCOSE BLDC GLUCOMTR-MCNC: 154 MG/DL — HIGH (ref 70–99)
GLUCOSE BLDC GLUCOMTR-MCNC: 169 MG/DL — HIGH (ref 70–99)
GLUCOSE BLDC GLUCOMTR-MCNC: 271 MG/DL — HIGH (ref 70–99)
HCT VFR BLD CALC: 42.1 % — SIGNIFICANT CHANGE UP (ref 39–50)
HCT VFR BLD CALC: 47.8 % — SIGNIFICANT CHANGE UP (ref 39–50)
HGB BLD-MCNC: 14.8 G/DL — SIGNIFICANT CHANGE UP (ref 13–17)
HGB BLD-MCNC: 15.4 G/DL — SIGNIFICANT CHANGE UP (ref 13–17)
MCHC RBC-ENTMCNC: 28.9 PG — SIGNIFICANT CHANGE UP (ref 27–34)
MCHC RBC-ENTMCNC: 31.1 PG — SIGNIFICANT CHANGE UP (ref 27–34)
MCHC RBC-ENTMCNC: 32.2 GM/DL — SIGNIFICANT CHANGE UP (ref 32–36)
MCHC RBC-ENTMCNC: 35.2 GM/DL — SIGNIFICANT CHANGE UP (ref 32–36)
MCV RBC AUTO: 88.4 FL — SIGNIFICANT CHANGE UP (ref 80–100)
MCV RBC AUTO: 89.7 FL — SIGNIFICANT CHANGE UP (ref 80–100)
PLATELET # BLD AUTO: 229 K/UL — SIGNIFICANT CHANGE UP (ref 150–400)
PLATELET # BLD AUTO: 252 K/UL — SIGNIFICANT CHANGE UP (ref 150–400)
RBC # BLD: 4.76 M/UL — SIGNIFICANT CHANGE UP (ref 4.2–5.8)
RBC # BLD: 5.33 M/UL — SIGNIFICANT CHANGE UP (ref 4.2–5.8)
RBC # FLD: 12.8 % — SIGNIFICANT CHANGE UP (ref 10.3–14.5)
RBC # FLD: 13.5 % — SIGNIFICANT CHANGE UP (ref 10.3–14.5)
WBC # BLD: 7.9 K/UL — SIGNIFICANT CHANGE UP (ref 3.8–10.5)
WBC # BLD: 9.55 K/UL — SIGNIFICANT CHANGE UP (ref 3.8–10.5)
WBC # FLD AUTO: 7.9 K/UL — SIGNIFICANT CHANGE UP (ref 3.8–10.5)
WBC # FLD AUTO: 9.55 K/UL — SIGNIFICANT CHANGE UP (ref 3.8–10.5)

## 2019-06-23 PROCEDURE — 99232 SBSQ HOSP IP/OBS MODERATE 35: CPT

## 2019-06-23 PROCEDURE — 99232 SBSQ HOSP IP/OBS MODERATE 35: CPT | Mod: GC

## 2019-06-23 RX ORDER — INSULIN GLARGINE 100 [IU]/ML
27 INJECTION, SOLUTION SUBCUTANEOUS AT BEDTIME
Refills: 0 | Status: DISCONTINUED | OUTPATIENT
Start: 2019-06-23 | End: 2019-06-25

## 2019-06-23 RX ORDER — INSULIN LISPRO 100/ML
9 VIAL (ML) SUBCUTANEOUS
Refills: 0 | Status: DISCONTINUED | OUTPATIENT
Start: 2019-06-23 | End: 2019-06-26

## 2019-06-23 RX ORDER — HEPARIN SODIUM 5000 [USP'U]/ML
3500 INJECTION INTRAVENOUS; SUBCUTANEOUS EVERY 6 HOURS
Refills: 0 | Status: DISCONTINUED | OUTPATIENT
Start: 2019-06-23 | End: 2019-06-25

## 2019-06-23 RX ORDER — HEPARIN SODIUM 5000 [USP'U]/ML
7500 INJECTION INTRAVENOUS; SUBCUTANEOUS EVERY 6 HOURS
Refills: 0 | Status: DISCONTINUED | OUTPATIENT
Start: 2019-06-23 | End: 2019-06-25

## 2019-06-23 RX ORDER — HEPARIN SODIUM 5000 [USP'U]/ML
INJECTION INTRAVENOUS; SUBCUTANEOUS
Qty: 25000 | Refills: 0 | Status: DISCONTINUED | OUTPATIENT
Start: 2019-06-23 | End: 2019-06-25

## 2019-06-23 RX ORDER — INSULIN GLARGINE 100 [IU]/ML
14 INJECTION, SOLUTION SUBCUTANEOUS ONCE
Refills: 0 | Status: COMPLETED | OUTPATIENT
Start: 2019-06-23 | End: 2019-06-23

## 2019-06-23 RX ORDER — HEPARIN SODIUM 5000 [USP'U]/ML
7500 INJECTION INTRAVENOUS; SUBCUTANEOUS ONCE
Refills: 0 | Status: COMPLETED | OUTPATIENT
Start: 2019-06-23 | End: 2019-06-23

## 2019-06-23 RX ORDER — INSULIN LISPRO 100/ML
7 VIAL (ML) SUBCUTANEOUS
Refills: 0 | Status: DISCONTINUED | OUTPATIENT
Start: 2019-06-23 | End: 2019-06-25

## 2019-06-23 RX ADMIN — INSULIN GLARGINE 14 UNIT(S): 100 INJECTION, SOLUTION SUBCUTANEOUS at 23:00

## 2019-06-23 RX ADMIN — Medication 7 UNIT(S): at 08:21

## 2019-06-23 RX ADMIN — Medication 7 UNIT(S): at 16:55

## 2019-06-23 RX ADMIN — SIMVASTATIN 20 MILLIGRAM(S): 20 TABLET, FILM COATED ORAL at 22:41

## 2019-06-23 RX ADMIN — HEPARIN SODIUM 1700 UNIT(S)/HR: 5000 INJECTION INTRAVENOUS; SUBCUTANEOUS at 04:13

## 2019-06-23 RX ADMIN — Medication 81 MILLIGRAM(S): at 11:01

## 2019-06-23 RX ADMIN — AMLODIPINE BESYLATE 5 MILLIGRAM(S): 2.5 TABLET ORAL at 05:22

## 2019-06-23 RX ADMIN — HEPARIN SODIUM 1400 UNIT(S)/HR: 5000 INJECTION INTRAVENOUS; SUBCUTANEOUS at 12:02

## 2019-06-23 RX ADMIN — Medication 7 UNIT(S): at 12:34

## 2019-06-23 RX ADMIN — Medication 1 TABLET(S): at 11:01

## 2019-06-23 RX ADMIN — HEPARIN SODIUM 7500 UNIT(S): 5000 INJECTION INTRAVENOUS; SUBCUTANEOUS at 04:15

## 2019-06-23 RX ADMIN — Medication 1: at 08:21

## 2019-06-23 RX ADMIN — Medication 1: at 16:54

## 2019-06-23 RX ADMIN — HEPARIN SODIUM 0 UNIT(S)/HR: 5000 INJECTION INTRAVENOUS; SUBCUTANEOUS at 11:00

## 2019-06-23 RX ADMIN — LOSARTAN POTASSIUM 25 MILLIGRAM(S): 100 TABLET, FILM COATED ORAL at 05:22

## 2019-06-23 RX ADMIN — Medication 3: at 12:34

## 2019-06-23 RX ADMIN — HEPARIN SODIUM 1200 UNIT(S)/HR: 5000 INJECTION INTRAVENOUS; SUBCUTANEOUS at 18:30

## 2019-06-23 NOTE — PROVIDER CONTACT NOTE (CRITICAL VALUE NOTIFICATION) - ASSESSMENT
pt sitting in chair, vs as noted, no s/s of distress noted. no signs of bleeding noted
Pt laying in bed, no s/s of distress noted. Pt was receiving NS IVF during phlebotomy above IVL.

## 2019-06-23 NOTE — PROGRESS NOTE ADULT - SUBJECTIVE AND OBJECTIVE BOX
Chief Complaint: Evaluating this 54 y/o M for uncontrolled Type 2 DM w/ hyperglycemia      Interval History: + po intake. No current complaints. Awaiting US. Still with prandial hyperglycemia.     MEDICATIONS  (STANDING):  amLODIPine   Tablet 5 milliGRAM(s) Oral daily  aspirin enteric coated 81 milliGRAM(s) Oral daily  dextrose 5%. 1000 milliLiter(s) (50 mL/Hr) IV Continuous <Continuous>  dextrose 50% Injectable 12.5 Gram(s) IV Push once  dextrose 50% Injectable 25 Gram(s) IV Push once  dextrose 50% Injectable 25 Gram(s) IV Push once  heparin  Infusion.  Unit(s)/Hr (17 mL/Hr) IV Continuous <Continuous>  insulin glargine Injectable (LANTUS) 27 Unit(s) SubCutaneous at bedtime  insulin lispro (HumaLOG) corrective regimen sliding scale   SubCutaneous at bedtime  insulin lispro (HumaLOG) corrective regimen sliding scale   SubCutaneous three times a day before meals  insulin lispro Injectable (HumaLOG) 7 Unit(s) SubCutaneous before dinner  insulin lispro Injectable (HumaLOG) 9 Unit(s) SubCutaneous before breakfast  insulin lispro Injectable (HumaLOG) 9 Unit(s) SubCutaneous before lunch  losartan 25 milliGRAM(s) Oral daily  multivitamin 1 Tablet(s) Oral daily  simvastatin 20 milliGRAM(s) Oral at bedtime  sodium chloride 0.9%. 1000 milliLiter(s) (100 mL/Hr) IV Continuous <Continuous>    MEDICATIONS  (PRN):  dextrose 40% Gel 15 Gram(s) Oral once PRN Blood Glucose LESS THAN 70 milliGRAM(s)/deciliter  glucagon  Injectable 1 milliGRAM(s) IntraMuscular once PRN Glucose LESS THAN 70 milligrams/deciliter  heparin  Injectable 7500 Unit(s) IV Push every 6 hours PRN For aPTT less than 40  heparin  Injectable 3500 Unit(s) IV Push every 6 hours PRN For aPTT between 40 - 57      Allergies    No Known Allergies    Intolerances      Review of Systems:  Constitutional: No fever  Eyes: No blurry vision  Cardiovascular: No chest pain  Respiratory: No SOB  GI: No abdominal pain, No nausea, No vomiting  Endocrine: as noted in HPI    All other negative      PHYSICAL EXAM:  VITALS: T(C): 36.6 (06-23-19 @ 11:59)  T(F): 97.8 (06-23-19 @ 11:59), Max: 97.9 (06-23-19 @ 04:20)  HR: 84 (06-23-19 @ 11:59) (84 - 96)  BP: 110/69 (06-23-19 @ 11:59) (110/69 - 131/85)  RR:  (18 - 18)  SpO2:  (96% - 97%)  Wt(kg): --  GENERAL: NAD at this time  EYES: EOMI, No proptosis  HEENT:  Atraumatic, Normocephalic,   RESPIRATORY: full excursion, non labored  CARDIOVASCULAR: normal S1/S2, no peripheral edema  GI: Soft, nontender, non distended, normal bowel sounds  SKIN: Warm and dry  PSYCH: normal affect, normal mood      POCT Blood Glucose.: 271 mg/dL (06-23-19 @ 11:59)  POCT Blood Glucose.: 169 mg/dL (06-23-19 @ 08:03)  POCT Blood Glucose.: 139 mg/dL (06-22-19 @ 21:34)  POCT Blood Glucose.: 233 mg/dL (06-22-19 @ 16:49)  POCT Blood Glucose.: 224 mg/dL (06-22-19 @ 12:02)  POCT Blood Glucose.: 186 mg/dL (06-22-19 @ 08:10)  POCT Blood Glucose.: 161 mg/dL (06-21-19 @ 21:22)  POCT Blood Glucose.: 198 mg/dL (06-21-19 @ 16:29)  POCT Blood Glucose.: 222 mg/dL (06-21-19 @ 11:40)  POCT Blood Glucose.: 229 mg/dL (06-21-19 @ 07:51)  POCT Blood Glucose.: 202 mg/dL (06-20-19 @ 21:19)  POCT Blood Glucose.: 296 mg/dL (06-20-19 @ 16:35)        06-22    138  |  106  |  32<H>  ----------------------------<  185<H>  4.2   |  22  |  1.20    EGFR if : 78  EGFR if non : 68    Ca    8.5      06-22  Mg     2.5     06-21  Phos  5.4     06-21    TPro  6.9  /  Alb  3.2<L>  /  TBili  0.3  /  DBili  x   /  AST  20  /  ALT  20  /  AlkPhos  92  06-22        Thyroid Function Tests:  06-21 @ 09:11 TSH 1.33 FreeT4 -- T3 -- Anti TPO -- Anti Thyroglobulin Ab -- TSI --      Hemoglobin A1C, Whole Blood: 11.5 % <H> [4.0 - 5.6] (06-20-19 @ 09:26)

## 2019-06-23 NOTE — PROGRESS NOTE ADULT - SUBJECTIVE AND OBJECTIVE BOX
24H hour events: No acute events overnight. No acute complaints.     MEDICATIONS:  amLODIPine   Tablet 5 milliGRAM(s) Oral daily  aspirin enteric coated 81 milliGRAM(s) Oral daily  heparin  Infusion.  Unit(s)/Hr IV Continuous <Continuous>  heparin  Injectable 7500 Unit(s) IV Push every 6 hours PRN  heparin  Injectable 3500 Unit(s) IV Push every 6 hours PRN  losartan 25 milliGRAM(s) Oral daily            dextrose 40% Gel 15 Gram(s) Oral once PRN  dextrose 50% Injectable 12.5 Gram(s) IV Push once  dextrose 50% Injectable 25 Gram(s) IV Push once  dextrose 50% Injectable 25 Gram(s) IV Push once  glucagon  Injectable 1 milliGRAM(s) IntraMuscular once PRN  insulin glargine Injectable (LANTUS) 27 Unit(s) SubCutaneous at bedtime  insulin lispro (HumaLOG) corrective regimen sliding scale   SubCutaneous at bedtime  insulin lispro (HumaLOG) corrective regimen sliding scale   SubCutaneous three times a day before meals  insulin lispro Injectable (HumaLOG) 7 Unit(s) SubCutaneous before dinner  insulin lispro Injectable (HumaLOG) 9 Unit(s) SubCutaneous before breakfast  insulin lispro Injectable (HumaLOG) 9 Unit(s) SubCutaneous before lunch  simvastatin 20 milliGRAM(s) Oral at bedtime    dextrose 5%. 1000 milliLiter(s) IV Continuous <Continuous>  multivitamin 1 Tablet(s) Oral daily  sodium chloride 0.9%. 1000 milliLiter(s) IV Continuous <Continuous>        PHYSICAL EXAM:  T(C): 36.6 (06-23-19 @ 11:59), Max: 36.6 (06-23-19 @ 04:20)  HR: 84 (06-23-19 @ 11:59) (84 - 96)  BP: 110/69 (06-23-19 @ 11:59) (110/69 - 131/85)  RR: 18 (06-23-19 @ 11:59) (18 - 18)  SpO2: 96% (06-23-19 @ 11:59) (96% - 97%)  CVP(mm Hg): --  I&O's Summary    22 Jun 2019 07:01  -  23 Jun 2019 07:00  --------------------------------------------------------  IN: 1011 mL / OUT: 1430 mL / NET: -419 mL    23 Jun 2019 07:01  -  23 Jun 2019 15:19  --------------------------------------------------------  IN: 480 mL / OUT: 550 mL / NET: -70 mL        Appearance: Normal	  HEENT:   Normal oral mucosa  Cardiovascular: normal rate, regular rhythm  Respiratory: Lungs clear to auscultation	  Psychiatry: Mood & affect appropriate  Gastrointestinal:  Soft  Skin: No rashes, No ecchymoses, No cyanosis	  Neurologic: Non-focal  Extremities: No clubbing, cyanosis or edema  Vascular: Peripheral pulses palpable         LABS:	 	    CBC Full  -  ( 23 Jun 2019 10:30 )  WBC Count : 7.9 K/uL  Hemoglobin : 14.8 g/dL  Hematocrit : 42.1 %  Platelet Count - Automated : 229 K/uL  Mean Cell Volume : 88.4 fl  Mean Cell Hemoglobin : 31.1 pg  Mean Cell Hemoglobin Concentration : 35.2 gm/dL  Auto Neutrophil # : x  Auto Lymphocyte # : x  Auto Monocyte # : x  Auto Eosinophil # : x  Auto Basophil # : x  Auto Neutrophil % : x  Auto Lymphocyte % : x  Auto Monocyte % : x  Auto Eosinophil % : x  Auto Basophil % : x    06-22    138  |  106  |  32<H>  ----------------------------<  185<H>  4.2   |  22  |  1.20  06-21    139  |  104  |  40<H>  ----------------------------<  175<H>  3.9   |  24  |  1.63<H>    Ca    8.5      22 Jun 2019 05:34  Ca    8.7      21 Jun 2019 21:27    TPro  6.9  /  Alb  3.2<L>  /  TBili  0.3  /  DBili  x   /  AST  20  /  ALT  20  /  AlkPhos  92  06-22  	    PREVIOUS DIAGNOSTIC TESTING:      Echocardiogram:  < from: Transthoracic Echocardiogram (06.21.19 @ 14:56) >  Conclusions:  1. Poorly visualized mitral valve but there appears to be  redundant chordae and/or abnormal subvalvular movement +/-  echodensity. Suggest KELSEY to further characterize if  clinically indicated.  No mitral valve regurgitation seen.  2. Moderate segmental left ventricular systolic  dysfunction. Hypokinesis of the periapical and apical  walls.  Left ventricular thrombus seen measuring 0.7 x 0.9  cm at the apex.  3. Mild diastolic dysfunction (Stage I).  4. The right ventricle is not well visualized; grossly  normal right ventricular systolic function.  Primary team notified of critical findings.    	  ASSESSMENT/PLAN: 52 yo male w h/o CAD (2 stents 6-7 yrs ago), DM, HTN, HLD, former smoker who presented to OSH with abd pain, transferred due to concern for ACS    1) Possible LV Thrombus  - continue Heparin gtt for now  - will consider further imaging to evaluate (KELSEY vs. Cardiac MRI, but will need to discuss with imaging specialist tomorrow to determine the appropriate study)    2) LV Dysfunction - unclear acuity, though suspect that this is a chronic finding, comparison with recently performed echo would be ideal as it could help to determine whether or not ischemic evaluation is warranted    3) MV Apparatus Abnormalities - will further image as above    Chance Desouza  Cardiology Fellow  Please feel free to contact me at [phone] (text or call) at any time.   NOTE: All Cardiology Service and Contact information can now be found at [email or web address], password: [email or web address]

## 2019-06-23 NOTE — PROVIDER CONTACT NOTE (CRITICAL VALUE NOTIFICATION) - ACTION/TREATMENT ORDERED:
BMP stat repeated, will continue to monitor patient.
NP aware, heparin gtt stopped for 1 hrs as per normogram and restarted at 14cc/hr, continue to monitor

## 2019-06-23 NOTE — CHART NOTE - NSCHARTNOTEFT_GEN_A_CORE
Medicine NP    Notified by cardiology, Dr. Parson, patient with LV thrombus seen on TTE from 6/21. Ordered full AC heparin gtt. Attending to be notified in AM.     Ashly Braxton, [facility]  [id number]

## 2019-06-24 LAB
ANION GAP SERPL CALC-SCNC: 13 MMOL/L — SIGNIFICANT CHANGE UP (ref 5–17)
APTT BLD: 64 SEC — HIGH (ref 27.5–36.3)
APTT BLD: 75.2 SEC — HIGH (ref 27.5–36.3)
BUN SERPL-MCNC: 20 MG/DL — SIGNIFICANT CHANGE UP (ref 7–23)
CALCIUM SERPL-MCNC: 9.2 MG/DL — SIGNIFICANT CHANGE UP (ref 8.4–10.5)
CHLORIDE SERPL-SCNC: 103 MMOL/L — SIGNIFICANT CHANGE UP (ref 96–108)
CO2 SERPL-SCNC: 19 MMOL/L — LOW (ref 22–31)
CREAT SERPL-MCNC: 1.01 MG/DL — SIGNIFICANT CHANGE UP (ref 0.5–1.3)
GLUCOSE BLDC GLUCOMTR-MCNC: 157 MG/DL — HIGH (ref 70–99)
GLUCOSE BLDC GLUCOMTR-MCNC: 168 MG/DL — HIGH (ref 70–99)
GLUCOSE BLDC GLUCOMTR-MCNC: 189 MG/DL — HIGH (ref 70–99)
GLUCOSE BLDC GLUCOMTR-MCNC: 210 MG/DL — HIGH (ref 70–99)
GLUCOSE SERPL-MCNC: 168 MG/DL — HIGH (ref 70–99)
HCT VFR BLD CALC: 46.3 % — SIGNIFICANT CHANGE UP (ref 39–50)
HGB BLD-MCNC: 15.1 G/DL — SIGNIFICANT CHANGE UP (ref 13–17)
INR BLD: 1.04 RATIO — SIGNIFICANT CHANGE UP (ref 0.88–1.16)
MCHC RBC-ENTMCNC: 28.8 PG — SIGNIFICANT CHANGE UP (ref 27–34)
MCHC RBC-ENTMCNC: 32.6 GM/DL — SIGNIFICANT CHANGE UP (ref 32–36)
MCV RBC AUTO: 88.2 FL — SIGNIFICANT CHANGE UP (ref 80–100)
PLATELET # BLD AUTO: 248 K/UL — SIGNIFICANT CHANGE UP (ref 150–400)
POTASSIUM SERPL-MCNC: 4.3 MMOL/L — SIGNIFICANT CHANGE UP (ref 3.5–5.3)
POTASSIUM SERPL-SCNC: 4.3 MMOL/L — SIGNIFICANT CHANGE UP (ref 3.5–5.3)
PROTHROM AB SERPL-ACNC: 12 SEC — SIGNIFICANT CHANGE UP (ref 10–12.9)
RBC # BLD: 5.25 M/UL — SIGNIFICANT CHANGE UP (ref 4.2–5.8)
RBC # FLD: 13.4 % — SIGNIFICANT CHANGE UP (ref 10.3–14.5)
SODIUM SERPL-SCNC: 135 MMOL/L — SIGNIFICANT CHANGE UP (ref 135–145)
WBC # BLD: 8.24 K/UL — SIGNIFICANT CHANGE UP (ref 3.8–10.5)
WBC # FLD AUTO: 8.24 K/UL — SIGNIFICANT CHANGE UP (ref 3.8–10.5)

## 2019-06-24 PROCEDURE — 99232 SBSQ HOSP IP/OBS MODERATE 35: CPT

## 2019-06-24 PROCEDURE — 76775 US EXAM ABDO BACK WALL LIM: CPT | Mod: 26

## 2019-06-24 PROCEDURE — 76770 US EXAM ABDO BACK WALL COMP: CPT | Mod: 26

## 2019-06-24 PROCEDURE — 99232 SBSQ HOSP IP/OBS MODERATE 35: CPT | Mod: GC

## 2019-06-24 RX ORDER — WARFARIN SODIUM 2.5 MG/1
6 TABLET ORAL ONCE
Refills: 0 | Status: COMPLETED | OUTPATIENT
Start: 2019-06-24 | End: 2019-06-24

## 2019-06-24 RX ADMIN — Medication 1: at 17:06

## 2019-06-24 RX ADMIN — Medication 81 MILLIGRAM(S): at 12:42

## 2019-06-24 RX ADMIN — Medication 9 UNIT(S): at 12:41

## 2019-06-24 RX ADMIN — Medication 7 UNIT(S): at 17:06

## 2019-06-24 RX ADMIN — SIMVASTATIN 20 MILLIGRAM(S): 20 TABLET, FILM COATED ORAL at 21:46

## 2019-06-24 RX ADMIN — Medication 1: at 08:04

## 2019-06-24 RX ADMIN — Medication 1 TABLET(S): at 12:42

## 2019-06-24 RX ADMIN — HEPARIN SODIUM 1200 UNIT(S)/HR: 5000 INJECTION INTRAVENOUS; SUBCUTANEOUS at 07:43

## 2019-06-24 RX ADMIN — LOSARTAN POTASSIUM 25 MILLIGRAM(S): 100 TABLET, FILM COATED ORAL at 06:15

## 2019-06-24 RX ADMIN — Medication 1: at 12:41

## 2019-06-24 RX ADMIN — HEPARIN SODIUM 1200 UNIT(S)/HR: 5000 INJECTION INTRAVENOUS; SUBCUTANEOUS at 01:05

## 2019-06-24 RX ADMIN — INSULIN GLARGINE 27 UNIT(S): 100 INJECTION, SOLUTION SUBCUTANEOUS at 21:47

## 2019-06-24 RX ADMIN — AMLODIPINE BESYLATE 5 MILLIGRAM(S): 2.5 TABLET ORAL at 06:15

## 2019-06-24 RX ADMIN — WARFARIN SODIUM 6 MILLIGRAM(S): 2.5 TABLET ORAL at 21:45

## 2019-06-24 NOTE — PROGRESS NOTE ADULT - SUBJECTIVE AND OBJECTIVE BOX
No events overnight. Denies CP, SOB, palp, dizziness.    MEDICATIONS:  amLODIPine   Tablet 5 milliGRAM(s) Oral daily  aspirin enteric coated 81 milliGRAM(s) Oral daily  heparin  Infusion.  Unit(s)/Hr IV Continuous <Continuous>  heparin  Injectable 7500 Unit(s) IV Push every 6 hours PRN  heparin  Injectable 3500 Unit(s) IV Push every 6 hours PRN  losartan 25 milliGRAM(s) Oral daily  dextrose 40% Gel 15 Gram(s) Oral once PRN  dextrose 50% Injectable 12.5 Gram(s) IV Push once  dextrose 50% Injectable 25 Gram(s) IV Push once  dextrose 50% Injectable 25 Gram(s) IV Push once  glucagon  Injectable 1 milliGRAM(s) IntraMuscular once PRN  insulin glargine Injectable (LANTUS) 27 Unit(s) SubCutaneous at bedtime  insulin lispro (HumaLOG) corrective regimen sliding scale   SubCutaneous at bedtime  insulin lispro (HumaLOG) corrective regimen sliding scale   SubCutaneous three times a day before meals  insulin lispro Injectable (HumaLOG) 7 Unit(s) SubCutaneous before dinner  insulin lispro Injectable (HumaLOG) 9 Unit(s) SubCutaneous before breakfast  insulin lispro Injectable (HumaLOG) 9 Unit(s) SubCutaneous before lunch  simvastatin 20 milliGRAM(s) Oral at bedtime    dextrose 5%. 1000 milliLiter(s) IV Continuous <Continuous>  multivitamin 1 Tablet(s) Oral daily  sodium chloride 0.9%. 1000 milliLiter(s) IV Continuous <Continuous>      REVIEW OF SYSTEMS:    CONSTITUTIONAL: No weakness, fevers or chills  EYES/ENT: No visual changes;  No dysphagia  RESPIRATORY: No cough, wheezing, hemoptysis; No shortness of breath  CARDIOVASCULAR: No chest pain or palpitations; No lower extremity edema  GASTROINTESTINAL: No abdominal or epigastric pain. No nausea, vomiting, or hematemesis  GENITOURINARY: No dysuria, frequency or hematuria  NEUROLOGICAL: No numbness or weakness  SKIN: No itching, burning, rashes, or lesions   HEME: No bleeding or bruising  MSK: No joint pains or muscle pains  All other review of systems is negative unless indicated above.    PHYSICAL EXAM:  T(C): 36.8 (06-24-19 @ 04:26), Max: 37.2 (06-23-19 @ 20:56)  HR: 86 (06-24-19 @ 04:26) (84 - 92)  BP: 121/77 (06-24-19 @ 04:26) (110/69 - 129/86)  RR: 18 (06-24-19 @ 04:26) (18 - 18)  SpO2: 99% (06-24-19 @ 04:26) (96% - 99%)  Wt(kg): --  I&O's Summary    23 Jun 2019 07:01  -  24 Jun 2019 07:00  --------------------------------------------------------  IN: 889 mL / OUT: 1350 mL / NET: -461 mL        Appearance: Normal	  HEENT:   Normal oral mucosa  Cardiovascular: Normal S1 S2, No JVD, No murmurs, No edema  Respiratory: Lungs clear to auscultation	  Psychiatry: A & O x 3, Mood & affect appropriate  Gastrointestinal:  Soft, Non-tender, + BS	  Skin: No rashes, No ecchymoses, No cyanosis	  Neurologic: Non-focal  Extremities: Normal range of motion, No clubbing, cyanosis        LABS:	 	    CBC Full  -  ( 23 Jun 2019 10:30 )  WBC Count : 7.9 K/uL  Hemoglobin : 14.8 g/dL  Hematocrit : 42.1 %  Platelet Count - Automated : 229 K/uL  Mean Cell Volume : 88.4 fl  Mean Cell Hemoglobin : 31.1 pg  Mean Cell Hemoglobin Concentration : 35.2 gm/dL  Auto Neutrophil # : x  Auto Lymphocyte # : x  Auto Monocyte # : x  Auto Eosinophil # : x  Auto Basophil # : x  Auto Neutrophil % : x  Auto Lymphocyte % : x  Auto Monocyte % : x  Auto Eosinophil % : x  Auto Basophil % : x    06-24    135  |  103  |  20  ----------------------------<  168<H>  4.3   |  19<L>  |  1.01    Ca    9.2      24 Jun 2019 07:28      TELEMETRY: 	  afib     PREVIOUS DIAGNOSTIC TESTING:    Echo 6/21/19  1. Poorly visualized mitral valve but there appears to be  redundant chordae and/or abnormal subvalvular movement +/-  echodensity. Suggest KELSEY to further characterize if  clinically indicated.  No mitral valve regurgitation seen.  2. Moderate segmental left ventricular systolic  dysfunction. EF 40-45%. Hypokinesis of the periapical and apical  walls.  Left ventricular thrombus seen measuring 0.7 x 0.9  cm at the apex.  3. Mild diastolic dysfunction (Stage I).  4. The right ventricle is not well visualized; grossly  normal right ventricular systolic function.  Primary team notified of critical findings.    ASSESSMENT/PLAN: 	  54 yo male w h/o CAD (2 stents 6-7 yrs ago), DM, HTN, HLD, former smoker who presented to OSH with abd pain, transferred due to concern for ACS.    1) Possible LV Thrombus  - continue Heparin gtt for now  - will consider further imaging to evaluate (KELSEY vs. Cardiac MRI, but will need to discuss with imaging specialist to determine the appropriate study)    2) LV Dysfunction - unclear acuity, though suspect that this is a chronic finding, comparison with recently performed echo would be ideal as it could help to determine whether or not ischemic evaluation is warranted  - pt unaware of finding, last echo about 7 yrs ago when pt received stents and was reportedly normal    3) MV Apparatus Abnormalities - will further image as above      Braden Lazaro MD  Cardiology Fellow, M-F 7:30A-5P  [phone]    All Cardiology service information can be found on [email or web address], password: [email or web address]. No events overnight. Denies CP, SOB, palp, dizziness.    MEDICATIONS:  amLODIPine   Tablet 5 milliGRAM(s) Oral daily  aspirin enteric coated 81 milliGRAM(s) Oral daily  heparin  Infusion.  Unit(s)/Hr IV Continuous <Continuous>  heparin  Injectable 7500 Unit(s) IV Push every 6 hours PRN  heparin  Injectable 3500 Unit(s) IV Push every 6 hours PRN  losartan 25 milliGRAM(s) Oral daily  dextrose 40% Gel 15 Gram(s) Oral once PRN  dextrose 50% Injectable 12.5 Gram(s) IV Push once  dextrose 50% Injectable 25 Gram(s) IV Push once  dextrose 50% Injectable 25 Gram(s) IV Push once  glucagon  Injectable 1 milliGRAM(s) IntraMuscular once PRN  insulin glargine Injectable (LANTUS) 27 Unit(s) SubCutaneous at bedtime  insulin lispro (HumaLOG) corrective regimen sliding scale   SubCutaneous at bedtime  insulin lispro (HumaLOG) corrective regimen sliding scale   SubCutaneous three times a day before meals  insulin lispro Injectable (HumaLOG) 7 Unit(s) SubCutaneous before dinner  insulin lispro Injectable (HumaLOG) 9 Unit(s) SubCutaneous before breakfast  insulin lispro Injectable (HumaLOG) 9 Unit(s) SubCutaneous before lunch  simvastatin 20 milliGRAM(s) Oral at bedtime    dextrose 5%. 1000 milliLiter(s) IV Continuous <Continuous>  multivitamin 1 Tablet(s) Oral daily  sodium chloride 0.9%. 1000 milliLiter(s) IV Continuous <Continuous>      REVIEW OF SYSTEMS:    CONSTITUTIONAL: No weakness, fevers or chills  EYES/ENT: No visual changes;  No dysphagia  RESPIRATORY: No cough, wheezing, hemoptysis; No shortness of breath  CARDIOVASCULAR: No chest pain or palpitations; No lower extremity edema  GASTROINTESTINAL: No abdominal or epigastric pain. No nausea, vomiting, or hematemesis  GENITOURINARY: No dysuria, frequency or hematuria  NEUROLOGICAL: No numbness or weakness  SKIN: No itching, burning, rashes, or lesions   HEME: No bleeding or bruising  MSK: No joint pains or muscle pains  All other review of systems is negative unless indicated above.    PHYSICAL EXAM:  T(C): 36.8 (06-24-19 @ 04:26), Max: 37.2 (06-23-19 @ 20:56)  HR: 86 (06-24-19 @ 04:26) (84 - 92)  BP: 121/77 (06-24-19 @ 04:26) (110/69 - 129/86)  RR: 18 (06-24-19 @ 04:26) (18 - 18)  SpO2: 99% (06-24-19 @ 04:26) (96% - 99%)  Wt(kg): --  I&O's Summary    23 Jun 2019 07:01  -  24 Jun 2019 07:00  --------------------------------------------------------  IN: 889 mL / OUT: 1350 mL / NET: -461 mL        Appearance: Normal	  HEENT:   Normal oral mucosa  Cardiovascular: Normal S1 S2, No JVD, No murmurs, No edema  Respiratory: Lungs clear to auscultation	  Psychiatry: A & O x 3, Mood & affect appropriate  Gastrointestinal:  Soft, Non-tender, + BS	  Skin: No rashes, No ecchymoses, No cyanosis	  Neurologic: Non-focal  Extremities: Normal range of motion, No clubbing, cyanosis        LABS:	 	    CBC Full  -  ( 23 Jun 2019 10:30 )  WBC Count : 7.9 K/uL  Hemoglobin : 14.8 g/dL  Hematocrit : 42.1 %  Platelet Count - Automated : 229 K/uL  Mean Cell Volume : 88.4 fl  Mean Cell Hemoglobin : 31.1 pg  Mean Cell Hemoglobin Concentration : 35.2 gm/dL  Auto Neutrophil # : x  Auto Lymphocyte # : x  Auto Monocyte # : x  Auto Eosinophil # : x  Auto Basophil # : x  Auto Neutrophil % : x  Auto Lymphocyte % : x  Auto Monocyte % : x  Auto Eosinophil % : x  Auto Basophil % : x    06-24    135  |  103  |  20  ----------------------------<  168<H>  4.3   |  19<L>  |  1.01    Ca    9.2      24 Jun 2019 07:28      TELEMETRY: 	  afib     PREVIOUS DIAGNOSTIC TESTING:    Echo 6/21/19  1. Poorly visualized mitral valve but there appears to be  redundant chordae and/or abnormal subvalvular movement +/-  echodensity. Suggest KELSEY to further characterize if  clinically indicated.  No mitral valve regurgitation seen.  2. Moderate segmental left ventricular systolic  dysfunction. EF 40-45%. Hypokinesis of the periapical and apical  walls.  Left ventricular thrombus seen measuring 0.7 x 0.9  cm at the apex.  3. Mild diastolic dysfunction (Stage I).  4. The right ventricle is not well visualized; grossly  normal right ventricular systolic function.  Primary team notified of critical findings.    ASSESSMENT/PLAN: 	  54 yo male w h/o CAD (2 stents 6-7 yrs ago), DM, HTN, HLD, former smoker who presented to OSH with abd pain, transferred due to concern for ACS/EKG changes. No evidence of ACS but patient with new echo findings.    1) LV Thrombus  - continue Heparin gtt for now  - will need to start coumadin for 6 mo    2) LV Dysfunction  - pt unaware of finding, last echo about 7 yrs ago when pt received stents and was reportedly normal  - euvolemic  - will need LHC    3) MV Apparatus Abnormalities - will further image as above      Braden Lazaro MD  Cardiology Fellow, M-F 7:30A-5P  [phone]    All Cardiology service information can be found on [email or web address], password: [email or web address]. No events overnight. Denies CP, SOB, palp, dizziness.    MEDICATIONS:  amLODIPine   Tablet 5 milliGRAM(s) Oral daily  aspirin enteric coated 81 milliGRAM(s) Oral daily  heparin  Infusion.  Unit(s)/Hr IV Continuous <Continuous>  heparin  Injectable 7500 Unit(s) IV Push every 6 hours PRN  heparin  Injectable 3500 Unit(s) IV Push every 6 hours PRN  losartan 25 milliGRAM(s) Oral daily  dextrose 40% Gel 15 Gram(s) Oral once PRN  dextrose 50% Injectable 12.5 Gram(s) IV Push once  dextrose 50% Injectable 25 Gram(s) IV Push once  dextrose 50% Injectable 25 Gram(s) IV Push once  glucagon  Injectable 1 milliGRAM(s) IntraMuscular once PRN  insulin glargine Injectable (LANTUS) 27 Unit(s) SubCutaneous at bedtime  insulin lispro (HumaLOG) corrective regimen sliding scale   SubCutaneous at bedtime  insulin lispro (HumaLOG) corrective regimen sliding scale   SubCutaneous three times a day before meals  insulin lispro Injectable (HumaLOG) 7 Unit(s) SubCutaneous before dinner  insulin lispro Injectable (HumaLOG) 9 Unit(s) SubCutaneous before breakfast  insulin lispro Injectable (HumaLOG) 9 Unit(s) SubCutaneous before lunch  simvastatin 20 milliGRAM(s) Oral at bedtime    dextrose 5%. 1000 milliLiter(s) IV Continuous <Continuous>  multivitamin 1 Tablet(s) Oral daily  sodium chloride 0.9%. 1000 milliLiter(s) IV Continuous <Continuous>      REVIEW OF SYSTEMS:    CONSTITUTIONAL: No weakness, fevers or chills  EYES/ENT: No visual changes;  No dysphagia  RESPIRATORY: No cough, wheezing, hemoptysis; No shortness of breath  CARDIOVASCULAR: No chest pain or palpitations; No lower extremity edema  GASTROINTESTINAL: No abdominal or epigastric pain. No nausea, vomiting, or hematemesis  GENITOURINARY: No dysuria, frequency or hematuria  NEUROLOGICAL: No numbness or weakness  SKIN: No itching, burning, rashes, or lesions   HEME: No bleeding or bruising  MSK: No joint pains or muscle pains  All other review of systems is negative unless indicated above.    PHYSICAL EXAM:  T(C): 36.8 (06-24-19 @ 04:26), Max: 37.2 (06-23-19 @ 20:56)  HR: 86 (06-24-19 @ 04:26) (84 - 92)  BP: 121/77 (06-24-19 @ 04:26) (110/69 - 129/86)  RR: 18 (06-24-19 @ 04:26) (18 - 18)  SpO2: 99% (06-24-19 @ 04:26) (96% - 99%)  Wt(kg): --  I&O's Summary    23 Jun 2019 07:01  -  24 Jun 2019 07:00  --------------------------------------------------------  IN: 889 mL / OUT: 1350 mL / NET: -461 mL        Appearance: Normal	  HEENT:   Normal oral mucosa  Cardiovascular: Normal S1 S2, No JVD, No murmurs, No edema  Respiratory: Lungs clear to auscultation	  Psychiatry: A & O x 3, Mood & affect appropriate  Gastrointestinal:  Soft, Non-tender, + BS	  Skin: No rashes, No ecchymoses, No cyanosis	  Neurologic: Non-focal  Extremities: Normal range of motion, No clubbing, cyanosis        LABS:	 	    CBC Full  -  ( 23 Jun 2019 10:30 )  WBC Count : 7.9 K/uL  Hemoglobin : 14.8 g/dL  Hematocrit : 42.1 %  Platelet Count - Automated : 229 K/uL  Mean Cell Volume : 88.4 fl  Mean Cell Hemoglobin : 31.1 pg  Mean Cell Hemoglobin Concentration : 35.2 gm/dL  Auto Neutrophil # : x  Auto Lymphocyte # : x  Auto Monocyte # : x  Auto Eosinophil # : x  Auto Basophil # : x  Auto Neutrophil % : x  Auto Lymphocyte % : x  Auto Monocyte % : x  Auto Eosinophil % : x  Auto Basophil % : x    06-24    135  |  103  |  20  ----------------------------<  168<H>  4.3   |  19<L>  |  1.01    Ca    9.2      24 Jun 2019 07:28      TELEMETRY: 	  afib     PREVIOUS DIAGNOSTIC TESTING:    Echo 6/21/19  1. Poorly visualized mitral valve but there appears to be  redundant chordae and/or abnormal subvalvular movement +/-  echodensity. Suggest KELSEY to further characterize if  clinically indicated.  No mitral valve regurgitation seen.  2. Moderate segmental left ventricular systolic  dysfunction. EF 40-45%. Hypokinesis of the periapical and apical  walls.  Left ventricular thrombus seen measuring 0.7 x 0.9  cm at the apex.  3. Mild diastolic dysfunction (Stage I).  4. The right ventricle is not well visualized; grossly  normal right ventricular systolic function.  Primary team notified of critical findings.    ASSESSMENT/PLAN: 	  52 yo male w h/o CAD (2 stents 6-7 yrs ago), DM, HTN, HLD, former smoker who presented to OSH with abd pain, transferred due to concern for ACS/EKG changes. No evidence of ACS but patient with new echo findings.    1) LV Thrombus  - continue Heparin gtt for now  - will need to start coumadin for 6 mo treatment after cardiac cath    2) LV Dysfunction  - pt unaware of finding, last echo about 7 yrs ago when pt received stents and was reportedly normal  - euvolemic  - will need LHC - plan for AM    3) MV Apparatus Abnormalities  - reviewed w/ echo attending - appears to be a redundant chordae, no further work up necessary      Braden Lazaro MD  Cardiology Fellow, M-F 7:30A-5P  [phone]    All Cardiology service information can be found on [email or web address], password: [email or web address].

## 2019-06-24 NOTE — PROGRESS NOTE ADULT - SUBJECTIVE AND OBJECTIVE BOX
DIABETES FOLLOW UP NOTE: Saw pt earlier today  INTERVAL HX: 52 y/o M w/h/o uncontrolled T2DM  with variable adherence to meds (Basaglar 25 units q hs plus Janumet 50/1000 bid). Here with L lower back pain irradiating to L lower abdomen found to have kidney stone in bladder. Pt voiding freely without pain. Reports tolerating POs with glycemic control improved while on present insulin doses. No hypoglycemia. Denies any CP/SOB or LBP at time of visit. Also hypertriglyceridemia on statin. Creat improved. Pt was NPO post mn for KELSEY but test cancelled.     Review of Systems:  General: As above  Cardiovascular: No chest pain, palpitations  Respiratory: No SOB, no cough  GI: No nausea, vomiting, abdominal pain  Endocrine: no polyuria, polydipsia or S&Sx of hypoglycemia    Allergies    No Known Allergies    Intolerances      MEDICATIONS    insulin glargine Injectable (LANTUS) 27 Unit(s) SubCutaneous at bedtime  insulin lispro Injectable (HumaLOG) 7 Unit(s) SubCutaneous before dinner  insulin lispro Injectable (HumaLOG) 9 Unit(s) SubCutaneous before breakfast  insulin lispro Injectable (HumaLOG) 9 Unit(s) SubCutaneous before lunch  insulin lispro (HumaLOG) corrective regimen sliding scale   SubCutaneous at bedtime  insulin lispro (HumaLOG) corrective regimen sliding scale   SubCutaneous three times a day before meals        PHYSICAL EXAM:  Vital Signs Last 24 Hrs  T(C): 36.7 (06-24-19 @ 13:29), Max: 37.2 (06-23-19 @ 20:56)  T(F): 98 (06-24-19 @ 13:29), Max: 98.9 (06-23-19 @ 20:56)  HR: 88 (06-24-19 @ 13:29) (86 - 92)  BP: 110/76 (06-24-19 @ 13:29) (110/76 - 129/86)  BP(mean): --  RR: 18 (06-24-19 @ 13:29) (18 - 18)  SpO2: 97% (06-24-19 @ 13:29) (97% - 99%)  GENERAL: Male laying in bed in NAD  Abdomen: Soft, nontender, non distended, obese  Extremities: Warm, no edema in all 4 exts  NEURO: A&O X3    LABS:  POCT Blood Glucose.: 189 mg/dL (06-24-19 @ 12:40)  POCT Blood Glucose.: 157 mg/dL (06-24-19 @ 08:04)  POCT Blood Glucose.: 123 mg/dL (06-23-19 @ 21:32)  POCT Blood Glucose.: 154 mg/dL (06-23-19 @ 16:44)  POCT Blood Glucose.: 271 mg/dL (06-23-19 @ 11:59)  POCT Blood Glucose.: 169 mg/dL (06-23-19 @ 08:03)  POCT Blood Glucose.: 139 mg/dL (06-22-19 @ 21:34)  POCT Blood Glucose.: 233 mg/dL (06-22-19 @ 16:49)                          15.1   8.24  )-----------( 248      ( 24 Jun 2019 09:35 )             46.3     06-24    135  |  103  |  20  ----------------------------<  168<H>  4.3   |  19<L>  |  1.01    Ca    9.2      24 Jun 2019 07:28      Thyroid Function Tests:  06-21 @ 09:11 TSH 1.33 FreeT4 -- T3 -- Anti TPO -- Anti Thyroglobulin Ab -- TSI --      Hemoglobin A1C, Whole Blood: 11.5 % <H> [4.0 - 5.6] (06-20-19 @ 09:26)      06-21 Chol 145 LDL Unable to calculate Test Repeated  LDL Cholesterol (mg/dL) --- Interpretive Comment (for adults 18 and over)  Optimal LDL Level may vary based on clinical situation  Below 70                   Ideal for people at very high risk of heart  disease  Below 100                  Ideal for people at risk of heart disease  100 - 129                    [place]  130 - 159                    Borderline high  160 - 189                    High  190 and Above           Very high HDL 23<L> Trig 430<H> DIABETES FOLLOW UP NOTE: Saw pt earlier today  INTERVAL HX: 52 y/o M w/h/o uncontrolled T2DM  with variable adherence to meds (Basaglar 25 units q hs plus Janumet 50/1000 bid). Here with L lower back pain irradiating to L lower abdomen found to have kidney stone in bladder. Pt voiding freely without pain. Reports tolerating POs with glycemic control improved while on present insulin doses. No hypoglycemia. Denies any CP/SOB or LBP at time of visit. Also hypertriglyceridemia on statin. Creat improved. Pt was NPO post mn for KELSEY to r/u LV thrombus but test was cancelled. Per team to restart diet today.    Review of Systems:  General: As above  Cardiovascular: No chest pain, palpitations  Respiratory: No SOB, no cough  GI: No nausea, vomiting, abdominal pain  Endocrine: no polyuria, polydipsia or S&Sx of hypoglycemia    Allergies    No Known Allergies    Intolerances      MEDICATIONS    insulin glargine Injectable (LANTUS) 27 Unit(s) SubCutaneous at bedtime  insulin lispro Injectable (HumaLOG) 7 Unit(s) SubCutaneous before dinner  insulin lispro Injectable (HumaLOG) 9 Unit(s) SubCutaneous before breakfast  insulin lispro Injectable (HumaLOG) 9 Unit(s) SubCutaneous before lunch  insulin lispro (HumaLOG) corrective regimen sliding scale   SubCutaneous at bedtime  insulin lispro (HumaLOG) corrective regimen sliding scale   SubCutaneous three times a day before meals        PHYSICAL EXAM:  Vital Signs Last 24 Hrs  T(C): 36.7 (06-24-19 @ 13:29), Max: 37.2 (06-23-19 @ 20:56)  T(F): 98 (06-24-19 @ 13:29), Max: 98.9 (06-23-19 @ 20:56)  HR: 88 (06-24-19 @ 13:29) (86 - 92)  BP: 110/76 (06-24-19 @ 13:29) (110/76 - 129/86)  BP(mean): --  RR: 18 (06-24-19 @ 13:29) (18 - 18)  SpO2: 97% (06-24-19 @ 13:29) (97% - 99%)  GENERAL: Male laying in bed in NAD  Abdomen: Soft, nontender, non distended, obese  Extremities: Warm, no edema in all 4 exts  NEURO: A&O X3    LABS:  POCT Blood Glucose.: 189 mg/dL (06-24-19 @ 12:40)  POCT Blood Glucose.: 157 mg/dL (06-24-19 @ 08:04)  POCT Blood Glucose.: 123 mg/dL (06-23-19 @ 21:32)  POCT Blood Glucose.: 154 mg/dL (06-23-19 @ 16:44)  POCT Blood Glucose.: 271 mg/dL (06-23-19 @ 11:59)  POCT Blood Glucose.: 169 mg/dL (06-23-19 @ 08:03)  POCT Blood Glucose.: 139 mg/dL (06-22-19 @ 21:34)  POCT Blood Glucose.: 233 mg/dL (06-22-19 @ 16:49)                          15.1   8.24  )-----------( 248      ( 24 Jun 2019 09:35 )             46.3     06-24    135  |  103  |  20  ----------------------------<  168<H>  4.3   |  19<L>  |  1.01    Ca    9.2      24 Jun 2019 07:28      Thyroid Function Tests:  06-21 @ 09:11 TSH 1.33 FreeT4 -- T3 -- Anti TPO -- Anti Thyroglobulin Ab -- TSI --      Hemoglobin A1C, Whole Blood: 11.5 % <H> [4.0 - 5.6] (06-20-19 @ 09:26)      06-21 Chol 145 LDL Unable to calculate Test Repeated  LDL Cholesterol (mg/dL) --- Interpretive Comment (for adults 18 and over)  Optimal LDL Level may vary based on clinical situation  Below 70                   Ideal for people at very high risk of heart  disease  Below 100                  Ideal for people at risk of heart disease  100 - 129                    [place]  130 - 159                    Borderline high  160 - 189                    High  190 and Above           Very high HDL 23<L> Trig 430<H>

## 2019-06-25 LAB
ANION GAP SERPL CALC-SCNC: 12 MMOL/L — SIGNIFICANT CHANGE UP (ref 5–17)
APTT BLD: 42.4 SEC — HIGH (ref 27.5–36.3)
BUN SERPL-MCNC: 24 MG/DL — HIGH (ref 7–23)
CALCIUM SERPL-MCNC: 9.1 MG/DL — SIGNIFICANT CHANGE UP (ref 8.4–10.5)
CHLORIDE SERPL-SCNC: 100 MMOL/L — SIGNIFICANT CHANGE UP (ref 96–108)
CO2 SERPL-SCNC: 22 MMOL/L — SIGNIFICANT CHANGE UP (ref 22–31)
CREAT SERPL-MCNC: 1.09 MG/DL — SIGNIFICANT CHANGE UP (ref 0.5–1.3)
GLUCOSE BLDC GLUCOMTR-MCNC: 201 MG/DL — HIGH (ref 70–99)
GLUCOSE BLDC GLUCOMTR-MCNC: 209 MG/DL — HIGH (ref 70–99)
GLUCOSE BLDC GLUCOMTR-MCNC: 225 MG/DL — HIGH (ref 70–99)
GLUCOSE BLDC GLUCOMTR-MCNC: 290 MG/DL — HIGH (ref 70–99)
GLUCOSE SERPL-MCNC: 200 MG/DL — HIGH (ref 70–99)
HCT VFR BLD CALC: 43.3 % — SIGNIFICANT CHANGE UP (ref 39–50)
HGB BLD-MCNC: 14 G/DL — SIGNIFICANT CHANGE UP (ref 13–17)
INR BLD: 0.99 RATIO — SIGNIFICANT CHANGE UP (ref 0.88–1.16)
MCHC RBC-ENTMCNC: 28.9 PG — SIGNIFICANT CHANGE UP (ref 27–34)
MCHC RBC-ENTMCNC: 32.3 GM/DL — SIGNIFICANT CHANGE UP (ref 32–36)
MCV RBC AUTO: 89.5 FL — SIGNIFICANT CHANGE UP (ref 80–100)
PLATELET # BLD AUTO: 251 K/UL — SIGNIFICANT CHANGE UP (ref 150–400)
POTASSIUM SERPL-MCNC: 4.3 MMOL/L — SIGNIFICANT CHANGE UP (ref 3.5–5.3)
POTASSIUM SERPL-SCNC: 4.3 MMOL/L — SIGNIFICANT CHANGE UP (ref 3.5–5.3)
PROTHROM AB SERPL-ACNC: 11.4 SEC — SIGNIFICANT CHANGE UP (ref 10–13.1)
RBC # BLD: 4.84 M/UL — SIGNIFICANT CHANGE UP (ref 4.2–5.8)
RBC # FLD: 13.3 % — SIGNIFICANT CHANGE UP (ref 10.3–14.5)
SODIUM SERPL-SCNC: 134 MMOL/L — LOW (ref 135–145)
WBC # BLD: 7.52 K/UL — SIGNIFICANT CHANGE UP (ref 3.8–10.5)
WBC # FLD AUTO: 7.52 K/UL — SIGNIFICANT CHANGE UP (ref 3.8–10.5)

## 2019-06-25 PROCEDURE — 99152 MOD SED SAME PHYS/QHP 5/>YRS: CPT | Mod: 59,GC

## 2019-06-25 PROCEDURE — 93454 CORONARY ARTERY ANGIO S&I: CPT | Mod: 26,GC

## 2019-06-25 PROCEDURE — 99232 SBSQ HOSP IP/OBS MODERATE 35: CPT | Mod: GC

## 2019-06-25 PROCEDURE — 99232 SBSQ HOSP IP/OBS MODERATE 35: CPT

## 2019-06-25 RX ORDER — ASPIRIN/CALCIUM CARB/MAGNESIUM 324 MG
1 TABLET ORAL
Qty: 0 | Refills: 0 | DISCHARGE

## 2019-06-25 RX ORDER — LOSARTAN POTASSIUM 100 MG/1
50 TABLET, FILM COATED ORAL DAILY
Refills: 0 | Status: DISCONTINUED | OUTPATIENT
Start: 2019-06-25 | End: 2019-06-29

## 2019-06-25 RX ORDER — HEPARIN SODIUM 5000 [USP'U]/ML
3500 INJECTION INTRAVENOUS; SUBCUTANEOUS EVERY 6 HOURS
Refills: 0 | Status: DISCONTINUED | OUTPATIENT
Start: 2019-06-25 | End: 2019-06-26

## 2019-06-25 RX ORDER — WARFARIN SODIUM 2.5 MG/1
6 TABLET ORAL ONCE
Refills: 0 | Status: COMPLETED | OUTPATIENT
Start: 2019-06-25 | End: 2019-06-25

## 2019-06-25 RX ORDER — ATORVASTATIN CALCIUM 80 MG/1
1 TABLET, FILM COATED ORAL
Qty: 0 | Refills: 0 | DISCHARGE

## 2019-06-25 RX ORDER — INSULIN LISPRO 100/ML
9 VIAL (ML) SUBCUTANEOUS
Refills: 0 | Status: DISCONTINUED | OUTPATIENT
Start: 2019-06-25 | End: 2019-06-26

## 2019-06-25 RX ORDER — INSULIN GLARGINE 100 [IU]/ML
29 INJECTION, SOLUTION SUBCUTANEOUS AT BEDTIME
Refills: 0 | Status: DISCONTINUED | OUTPATIENT
Start: 2019-06-25 | End: 2019-06-26

## 2019-06-25 RX ORDER — HEPARIN SODIUM 5000 [USP'U]/ML
1200 INJECTION INTRAVENOUS; SUBCUTANEOUS
Qty: 25000 | Refills: 0 | Status: DISCONTINUED | OUTPATIENT
Start: 2019-06-25 | End: 2019-06-26

## 2019-06-25 RX ORDER — METOPROLOL TARTRATE 50 MG
12.5 TABLET ORAL EVERY 12 HOURS
Refills: 0 | Status: DISCONTINUED | OUTPATIENT
Start: 2019-06-25 | End: 2019-06-29

## 2019-06-25 RX ORDER — INSULIN DETEMIR 100/ML (3)
0 INSULIN PEN (ML) SUBCUTANEOUS
Qty: 0 | Refills: 0 | DISCHARGE

## 2019-06-25 RX ORDER — SITAGLIPTIN AND METFORMIN HYDROCHLORIDE 500; 50 MG/1; MG/1
1 TABLET, FILM COATED ORAL
Qty: 0 | Refills: 0 | DISCHARGE

## 2019-06-25 RX ORDER — HEPARIN SODIUM 5000 [USP'U]/ML
7500 INJECTION INTRAVENOUS; SUBCUTANEOUS EVERY 6 HOURS
Refills: 0 | Status: DISCONTINUED | OUTPATIENT
Start: 2019-06-25 | End: 2019-06-26

## 2019-06-25 RX ADMIN — Medication 3: at 13:03

## 2019-06-25 RX ADMIN — Medication 9 UNIT(S): at 17:26

## 2019-06-25 RX ADMIN — Medication 9 UNIT(S): at 13:03

## 2019-06-25 RX ADMIN — SIMVASTATIN 20 MILLIGRAM(S): 20 TABLET, FILM COATED ORAL at 21:30

## 2019-06-25 RX ADMIN — LOSARTAN POTASSIUM 25 MILLIGRAM(S): 100 TABLET, FILM COATED ORAL at 05:08

## 2019-06-25 RX ADMIN — Medication 81 MILLIGRAM(S): at 09:08

## 2019-06-25 RX ADMIN — AMLODIPINE BESYLATE 5 MILLIGRAM(S): 2.5 TABLET ORAL at 05:08

## 2019-06-25 RX ADMIN — Medication 9 UNIT(S): at 08:08

## 2019-06-25 RX ADMIN — Medication 2: at 08:08

## 2019-06-25 RX ADMIN — HEPARIN SODIUM 1200 UNIT(S)/HR: 5000 INJECTION INTRAVENOUS; SUBCUTANEOUS at 17:56

## 2019-06-25 RX ADMIN — WARFARIN SODIUM 6 MILLIGRAM(S): 2.5 TABLET ORAL at 21:30

## 2019-06-25 RX ADMIN — Medication 1 TABLET(S): at 13:03

## 2019-06-25 RX ADMIN — Medication 2: at 17:26

## 2019-06-25 RX ADMIN — INSULIN GLARGINE 29 UNIT(S): 100 INJECTION, SOLUTION SUBCUTANEOUS at 21:30

## 2019-06-25 NOTE — PROGRESS NOTE ADULT - SUBJECTIVE AND OBJECTIVE BOX
DIABETES FOLLOW UP NOTE: Saw pt earlier today  INTERVAL HX: 52 y/o M w/h/o uncontrolled T2DM  with variable adherence to meds (Basaglar 25 units q hs plus Janumet 50/1000 bid). Here with L lower back pain irradiating to L lower abdomen found to have kidney stone in bladder. Pt voiding freely without pain. Reports tolerating POs with glycemic control variable while on present insulin doses. Noted BG 200s last night and today. Pt states not eating anything after dinner last night. No hypoglycemia. Denies any CP/SOB or LBP at time of visit. Also hypertriglyceridemia on statin. Creat stable. Pt seen after cardiac cath done today.    Review of Systems:  General: As above  Cardiovascular: No chest pain, palpitations  Respiratory: No SOB, no cough  GI: No nausea, vomiting, abdominal pain  Endocrine: no polyuria, polydipsia or S&Sx of hypoglycemia    Allergies    No Known Allergies    Intolerances      MEDICATIONS    insulin glargine Injectable (LANTUS) 27 Unit(s) SubCutaneous at bedtime  insulin lispro Injectable (HumaLOG) 7 Unit(s) SubCutaneous before dinner  insulin lispro Injectable (HumaLOG) 9 Unit(s) SubCutaneous before breakfast  insulin lispro Injectable (HumaLOG) 9 Unit(s) SubCutaneous before lunch  insulin lispro (HumaLOG) corrective regimen sliding scale   SubCutaneous at bedtime  insulin lispro (HumaLOG) corrective regimen sliding scale   SubCutaneous three times a day before meals  simvastatin 20 milliGRAM(s) Oral at bedtime      PHYSICAL EXAM:  Vital Signs Last 24 Hrs  T(C): 36.4 (06-25-19 @ 13:44), Max: 36.8 (06-24-19 @ 20:48)  T(F): 97.5 (06-25-19 @ 13:44), Max: 98.2 (06-24-19 @ 20:48)  HR: 87 (06-25-19 @ 13:44) (84 - 90)  BP: 117/80 (06-25-19 @ 13:44) (117/80 - 148/87)  BP(mean): --  RR: 18 (06-25-19 @ 13:44) (16 - 18)  SpO2: 96% (06-25-19 @ 13:44) (96% - 99%)  GENERAL: Male laying in bed in NAD  Abdomen: Soft, nontender, non distended, obese  Extremities: Warm, no edema in all 4 exts. R wrist dressing D&I  NEURO: A&O X3    LABS:  POCT Blood Glucose.: 290 mg/dL (06-25-19 @ 12:25)  POCT Blood Glucose.: 225 mg/dL (06-25-19 @ 07:57)  POCT Blood Glucose.: 210 mg/dL (06-24-19 @ 21:44)  POCT Blood Glucose.: 168 mg/dL (06-24-19 @ 16:57)  POCT Blood Glucose.: 189 mg/dL (06-24-19 @ 12:40)  POCT Blood Glucose.: 157 mg/dL (06-24-19 @ 08:04)  POCT Blood Glucose.: 123 mg/dL (06-23-19 @ 21:32)  POCT Blood Glucose.: 154 mg/dL (06-23-19 @ 16:44)                          14.0   7.52  )-----------( 251      ( 25 Jun 2019 10:08 )             43.3     06-25    134<L>  |  100  |  24<H>  ----------------------------<  200<H>  4.3   |  22  |  1.09    Ca    9.1      25 Jun 2019 07:01    Thyroid Function Tests:  06-21 @ 09:11 TSH 1.33 FreeT4 -- T3 -- Anti TPO -- Anti Thyroglobulin Ab -- TSI --      Hemoglobin A1C, Whole Blood: 11.5 % <H> [4.0 - 5.6] (06-20-19 @ 09:26)      06-21 Chol 145 LDL Unable to calculate Test Repeated  LDL Cholesterol (mg/dL) --- Interpretive Comment (for adults 18 and over)  Optimal LDL Level may vary based on clinical situation  Below 70                   Ideal for people at very high risk of heart  disease  Below 100                  Ideal for people at risk of heart disease  100 - 129                    Near [place]  130 - 159                    Borderline high  160 - 189                    High  190 and Above           Very high HDL 23<L> Trig 430<H>

## 2019-06-25 NOTE — PROGRESS NOTE ADULT - ATTENDING COMMENTS
Patient interviewed and examined. I was physically present for the essential portions of the E/M service provided.  Chart reviewed and note edited where appropriate.  Case discussed with fellow.  Agree w/ Assessment and Plan as outlined.    CATH REVIEWED:  PATENT LAD stents.  mild, non-obstructive dz    Daniel Cramer MD [facility]  Spectra:  [phone]  Office: [phone]
Patient seen and examined. Agree with assessment and plan as outlined above. LV dysfunction of unclear duration with LV thrombus. Continue Hep gtt for thrombus. Patient with prior PCI about 7 years ago but does not know which vessel. He is also not aware of any "weakness in his heart muscle." He is likely to require cardiac cath to assess for progression of CAD will need to clear with renal. Patient is on statin and ARB already. Ideally should be started on beta blockade.
Patient interviewed and examined. I was physically present for the essential portions of the E/M service provided.  Chart reviewed and note edited where appropriate.  Case discussed with fellow.  Agree w/ Assessment and Plan as outlined.    Daniel Cramer MD [facility]  Spectra:  [phone]  Office: [phone]

## 2019-06-25 NOTE — PROGRESS NOTE ADULT - SUBJECTIVE AND OBJECTIVE BOX
No events overnight. Denies CP, SOB, palp, dizziness.    MEDICATIONS:  amLODIPine   Tablet 5 milliGRAM(s) Oral daily  aspirin enteric coated 81 milliGRAM(s) Oral daily  heparin  Infusion.  Unit(s)/Hr IV Continuous <Continuous>  heparin  Injectable 7500 Unit(s) IV Push every 6 hours PRN  heparin  Injectable 3500 Unit(s) IV Push every 6 hours PRN  losartan 25 milliGRAM(s) Oral daily  dextrose 40% Gel 15 Gram(s) Oral once PRN  dextrose 50% Injectable 12.5 Gram(s) IV Push once  dextrose 50% Injectable 25 Gram(s) IV Push once  dextrose 50% Injectable 25 Gram(s) IV Push once  glucagon  Injectable 1 milliGRAM(s) IntraMuscular once PRN  insulin glargine Injectable (LANTUS) 27 Unit(s) SubCutaneous at bedtime  insulin lispro (HumaLOG) corrective regimen sliding scale   SubCutaneous at bedtime  insulin lispro (HumaLOG) corrective regimen sliding scale   SubCutaneous three times a day before meals  insulin lispro Injectable (HumaLOG) 7 Unit(s) SubCutaneous before dinner  insulin lispro Injectable (HumaLOG) 9 Unit(s) SubCutaneous before breakfast  insulin lispro Injectable (HumaLOG) 9 Unit(s) SubCutaneous before lunch  simvastatin 20 milliGRAM(s) Oral at bedtime    dextrose 5%. 1000 milliLiter(s) IV Continuous <Continuous>  multivitamin 1 Tablet(s) Oral daily  sodium chloride 0.9%. 1000 milliLiter(s) IV Continuous <Continuous>      REVIEW OF SYSTEMS:    CONSTITUTIONAL: No weakness, fevers or chills  EYES/ENT: No visual changes;  No dysphagia  RESPIRATORY: No cough, wheezing, hemoptysis; No shortness of breath  CARDIOVASCULAR: No chest pain or palpitations; No lower extremity edema  GASTROINTESTINAL: No abdominal or epigastric pain. No nausea, vomiting, or hematemesis  GENITOURINARY: No dysuria, frequency or hematuria  NEUROLOGICAL: No numbness or weakness  SKIN: No itching, burning, rashes, or lesions   HEME: No bleeding or bruising  MSK: No joint pains or muscle pains  All other review of systems is negative unless indicated above.    PHYSICAL EXAM:  T(C): 36.5 (06-25-19 @ 04:19), Max: 36.8 (06-24-19 @ 20:48)  HR: 89 (06-25-19 @ 04:19) (88 - 90)  BP: 120/77 (06-25-19 @ 04:19) (110/76 - 131/86)  RR: 18 (06-25-19 @ 04:19) (18 - 18)  SpO2: 99% (06-25-19 @ 04:19) (97% - 99%)  Wt(kg): --  I&O's Summary    24 Jun 2019 07:01  -  25 Jun 2019 07:00  --------------------------------------------------------  IN: 864 mL / OUT: 0 mL / NET: 864 mL        Appearance: Normal	  HEENT:   Normal oral mucosa  Cardiovascular: Normal S1 S2, No JVD, No murmurs, No edema  Respiratory: Lungs clear to auscultation	  Psychiatry: A & O x 3, Mood & affect appropriate  Gastrointestinal:  Soft, Non-tender, + BS	  Skin: No rashes, No ecchymoses, No cyanosis	  Neurologic: Non-focal  Extremities: Normal range of motion, No clubbing, cyanosis        LABS:	 	    CBC Full  -  ( 24 Jun 2019 09:35 )  WBC Count : 8.24 K/uL  Hemoglobin : 15.1 g/dL  Hematocrit : 46.3 %  Platelet Count - Automated : 248 K/uL  Mean Cell Volume : 88.2 fl  Mean Cell Hemoglobin : 28.8 pg  Mean Cell Hemoglobin Concentration : 32.6 gm/dL  Auto Neutrophil # : x  Auto Lymphocyte # : x  Auto Monocyte # : x  Auto Eosinophil # : x  Auto Basophil # : x  Auto Neutrophil % : x  Auto Lymphocyte % : x  Auto Monocyte % : x  Auto Eosinophil % : x  Auto Basophil % : x    06-25    134<L>  |  100  |  24<H>  ----------------------------<  200<H>  4.3   |  22  |  1.09  06-24    135  |  103  |  20  ----------------------------<  168<H>  4.3   |  19<L>  |  1.01    Ca    9.1      25 Jun 2019 07:01  Ca    9.2      24 Jun 2019 07:28      PREVIOUS DIAGNOSTIC TESTING:    Echo 6/21/19  1. Poorly visualized mitral valve but there appears to be  redundant chordae and/or abnormal subvalvular movement +/-  echodensity. Suggest KELSEY to further characterize if  clinically indicated.  No mitral valve regurgitation seen.  2. Moderate segmental left ventricular systolic  dysfunction. EF 40-45%. Hypokinesis of the periapical and apical  walls.  Left ventricular thrombus seen measuring 0.7 x 0.9  cm at the apex.  3. Mild diastolic dysfunction (Stage I).  4. The right ventricle is not well visualized; grossly  normal right ventricular systolic function.  Primary team notified of critical findings.      ASSESSMENT/PLAN: 	  52 yo male w h/o CAD (2 stents 6-7 yrs ago), DM, HTN, HLD, former smoker who presented to OSH with abd pain, transferred due to concern for ACS/EKG changes. No evidence of ACS but patient with new echo findings.    1) LV Thrombus  - continue Heparin gtt for now  - will need to start coumadin for 6 mo treatment after cardiac cath    2) LV Dysfunction  - pt unaware of finding, last echo about 7 yrs ago when pt received stents and was reportedly normal  - euvolemic  - c/w asa, statin, losartan  - d/c amlodipine (relative contraindication in HF)  - [facility] today    3) MV Apparatus Abnormalities  - reviewed w/ echo attending - appears to be a redundant chordae, no further work up necessary  	    Braden Lazaro MD  Cardiology Fellow, M-F 7:30A-5P  [phone]    All Cardiology service information can be found on [email or web address], password: [email or web address]. No events overnight. Denies CP, SOB, palp, dizziness.    MEDICATIONS:  amLODIPine   Tablet 5 milliGRAM(s) Oral daily  aspirin enteric coated 81 milliGRAM(s) Oral daily  heparin  Infusion.  Unit(s)/Hr IV Continuous <Continuous>  heparin  Injectable 7500 Unit(s) IV Push every 6 hours PRN  heparin  Injectable 3500 Unit(s) IV Push every 6 hours PRN  losartan 25 milliGRAM(s) Oral daily  dextrose 40% Gel 15 Gram(s) Oral once PRN  dextrose 50% Injectable 12.5 Gram(s) IV Push once  dextrose 50% Injectable 25 Gram(s) IV Push once  dextrose 50% Injectable 25 Gram(s) IV Push once  glucagon  Injectable 1 milliGRAM(s) IntraMuscular once PRN  insulin glargine Injectable (LANTUS) 27 Unit(s) SubCutaneous at bedtime  insulin lispro (HumaLOG) corrective regimen sliding scale   SubCutaneous at bedtime  insulin lispro (HumaLOG) corrective regimen sliding scale   SubCutaneous three times a day before meals  insulin lispro Injectable (HumaLOG) 7 Unit(s) SubCutaneous before dinner  insulin lispro Injectable (HumaLOG) 9 Unit(s) SubCutaneous before breakfast  insulin lispro Injectable (HumaLOG) 9 Unit(s) SubCutaneous before lunch  simvastatin 20 milliGRAM(s) Oral at bedtime    dextrose 5%. 1000 milliLiter(s) IV Continuous <Continuous>  multivitamin 1 Tablet(s) Oral daily  sodium chloride 0.9%. 1000 milliLiter(s) IV Continuous <Continuous>      REVIEW OF SYSTEMS:    CONSTITUTIONAL: No weakness, fevers or chills  EYES/ENT: No visual changes;  No dysphagia  RESPIRATORY: No cough, wheezing, hemoptysis; No shortness of breath  CARDIOVASCULAR: No chest pain or palpitations; No lower extremity edema  GASTROINTESTINAL: No abdominal or epigastric pain. No nausea, vomiting, or hematemesis  GENITOURINARY: No dysuria, frequency or hematuria  NEUROLOGICAL: No numbness or weakness  SKIN: No itching, burning, rashes, or lesions   HEME: No bleeding or bruising  MSK: No joint pains or muscle pains  All other review of systems is negative unless indicated above.    PHYSICAL EXAM:  T(C): 36.5 (06-25-19 @ 04:19), Max: 36.8 (06-24-19 @ 20:48)  HR: 89 (06-25-19 @ 04:19) (88 - 90)  BP: 120/77 (06-25-19 @ 04:19) (110/76 - 131/86)  RR: 18 (06-25-19 @ 04:19) (18 - 18)  SpO2: 99% (06-25-19 @ 04:19) (97% - 99%)  Wt(kg): --  I&O's Summary    24 Jun 2019 07:01  -  25 Jun 2019 07:00  --------------------------------------------------------  IN: 864 mL / OUT: 0 mL / NET: 864 mL        Appearance: Normal	  HEENT:   Normal oral mucosa  Cardiovascular: Normal S1 S2, No JVD, No murmurs, No edema  Respiratory: Lungs clear to auscultation	  Psychiatry: A & O x 3, Mood & affect appropriate  Gastrointestinal:  Soft, Non-tender, + BS	  Skin: No rashes, No ecchymoses, No cyanosis	  Neurologic: Non-focal  Extremities: Normal range of motion, No clubbing, cyanosis        LABS:	 	    CBC Full  -  ( 24 Jun 2019 09:35 )  WBC Count : 8.24 K/uL  Hemoglobin : 15.1 g/dL  Hematocrit : 46.3 %  Platelet Count - Automated : 248 K/uL  Mean Cell Volume : 88.2 fl  Mean Cell Hemoglobin : 28.8 pg  Mean Cell Hemoglobin Concentration : 32.6 gm/dL  Auto Neutrophil # : x  Auto Lymphocyte # : x  Auto Monocyte # : x  Auto Eosinophil # : x  Auto Basophil # : x  Auto Neutrophil % : x  Auto Lymphocyte % : x  Auto Monocyte % : x  Auto Eosinophil % : x  Auto Basophil % : x    06-25    134<L>  |  100  |  24<H>  ----------------------------<  200<H>  4.3   |  22  |  1.09  06-24    135  |  103  |  20  ----------------------------<  168<H>  4.3   |  19<L>  |  1.01    Ca    9.1      25 Jun 2019 07:01  Ca    9.2      24 Jun 2019 07:28      PREVIOUS DIAGNOSTIC TESTING:    Echo 6/21/19  1. Poorly visualized mitral valve but there appears to be  redundant chordae and/or abnormal subvalvular movement +/-  echodensity. Suggest KELSEY to further characterize if  clinically indicated.  No mitral valve regurgitation seen.  2. Moderate segmental left ventricular systolic  dysfunction. EF 40-45%. Hypokinesis of the periapical and apical  walls.  Left ventricular thrombus seen measuring 0.7 x 0.9  cm at the apex.  3. Mild diastolic dysfunction (Stage I).  4. The right ventricle is not well visualized; grossly  normal right ventricular systolic function.  Primary team notified of critical findings.      Cath 6/25/19  patent LAD stent x2  non-obstructive disease      ASSESSMENT/PLAN: 	  54 yo male w h/o CAD (2 stents 6-7 yrs ago), DM, HTN, HLD, former smoker who presented to OSH with abd pain, transferred due to concern for ACS/EKG changes. No evidence of ACS but patient with new echo findings.    1) LV Thrombus  - start coumadin w/ hep gtt bridge    2) niCMP, EF 40-45%  - s/p cath w/ open stents, non-obstructive disease  - euvolemic  - c/w asa, statin  - increase losartan to 50mg daily  - start metop tart 12.5mg BID PO  - d/c amlodipine (relative contraindication in HF)    3) MV Apparatus Abnormalities  - reviewed w/ echo attending - appears to be a redundant chordae, no further work up necessary  	    Braden Lazaro MD  Cardiology Fellow, M-F 7:30A-5P  [phone]    All Cardiology service information can be found on [email or web address], password: [email or web address].

## 2019-06-26 LAB
ANION GAP SERPL CALC-SCNC: 11 MMOL/L — SIGNIFICANT CHANGE UP (ref 5–17)
APTT BLD: 129.8 SEC — CRITICAL HIGH (ref 27.5–36.3)
APTT BLD: 47.4 SEC — HIGH (ref 27.5–36.3)
APTT BLD: 61 SEC — HIGH (ref 27.5–36.3)
APTT BLD: 85 SEC — HIGH (ref 27.5–36.3)
BUN SERPL-MCNC: 22 MG/DL — SIGNIFICANT CHANGE UP (ref 7–23)
CALCIUM SERPL-MCNC: 9.6 MG/DL — SIGNIFICANT CHANGE UP (ref 8.4–10.5)
CHLORIDE SERPL-SCNC: 102 MMOL/L — SIGNIFICANT CHANGE UP (ref 96–108)
CO2 SERPL-SCNC: 22 MMOL/L — SIGNIFICANT CHANGE UP (ref 22–31)
CREAT SERPL-MCNC: 0.98 MG/DL — SIGNIFICANT CHANGE UP (ref 0.5–1.3)
CULTURE RESULTS: SIGNIFICANT CHANGE UP
CULTURE RESULTS: SIGNIFICANT CHANGE UP
GLUCOSE BLDC GLUCOMTR-MCNC: 115 MG/DL — HIGH (ref 70–99)
GLUCOSE BLDC GLUCOMTR-MCNC: 175 MG/DL — HIGH (ref 70–99)
GLUCOSE BLDC GLUCOMTR-MCNC: 203 MG/DL — HIGH (ref 70–99)
GLUCOSE BLDC GLUCOMTR-MCNC: 220 MG/DL — HIGH (ref 70–99)
GLUCOSE SERPL-MCNC: 215 MG/DL — HIGH (ref 70–99)
HCT VFR BLD CALC: 43.8 % — SIGNIFICANT CHANGE UP (ref 39–50)
HCT VFR BLD CALC: 45.6 % — SIGNIFICANT CHANGE UP (ref 39–50)
HGB BLD-MCNC: 14.4 G/DL — SIGNIFICANT CHANGE UP (ref 13–17)
HGB BLD-MCNC: 14.5 G/DL — SIGNIFICANT CHANGE UP (ref 13–17)
INR BLD: 1.05 RATIO — SIGNIFICANT CHANGE UP (ref 0.88–1.16)
MCHC RBC-ENTMCNC: 28.6 PG — SIGNIFICANT CHANGE UP (ref 27–34)
MCHC RBC-ENTMCNC: 29.1 PG — SIGNIFICANT CHANGE UP (ref 27–34)
MCHC RBC-ENTMCNC: 31.8 GM/DL — LOW (ref 32–36)
MCHC RBC-ENTMCNC: 32.8 GM/DL — SIGNIFICANT CHANGE UP (ref 32–36)
MCV RBC AUTO: 88.7 FL — SIGNIFICANT CHANGE UP (ref 80–100)
MCV RBC AUTO: 89.9 FL — SIGNIFICANT CHANGE UP (ref 80–100)
PLATELET # BLD AUTO: 251 K/UL — SIGNIFICANT CHANGE UP (ref 150–400)
PLATELET # BLD AUTO: 267 K/UL — SIGNIFICANT CHANGE UP (ref 150–400)
POTASSIUM SERPL-MCNC: 4.9 MMOL/L — SIGNIFICANT CHANGE UP (ref 3.5–5.3)
POTASSIUM SERPL-SCNC: 4.9 MMOL/L — SIGNIFICANT CHANGE UP (ref 3.5–5.3)
PROTHROM AB SERPL-ACNC: 12.1 SEC — SIGNIFICANT CHANGE UP (ref 10–13.1)
RBC # BLD: 4.94 M/UL — SIGNIFICANT CHANGE UP (ref 4.2–5.8)
RBC # BLD: 5.07 M/UL — SIGNIFICANT CHANGE UP (ref 4.2–5.8)
RBC # FLD: 12.9 % — SIGNIFICANT CHANGE UP (ref 10.3–14.5)
RBC # FLD: 13.6 % — SIGNIFICANT CHANGE UP (ref 10.3–14.5)
SODIUM SERPL-SCNC: 135 MMOL/L — SIGNIFICANT CHANGE UP (ref 135–145)
SPECIMEN SOURCE: SIGNIFICANT CHANGE UP
SPECIMEN SOURCE: SIGNIFICANT CHANGE UP
WBC # BLD: 8.32 K/UL — SIGNIFICANT CHANGE UP (ref 3.8–10.5)
WBC # BLD: 9.3 K/UL — SIGNIFICANT CHANGE UP (ref 3.8–10.5)
WBC # FLD AUTO: 8.32 K/UL — SIGNIFICANT CHANGE UP (ref 3.8–10.5)
WBC # FLD AUTO: 9.3 K/UL — SIGNIFICANT CHANGE UP (ref 3.8–10.5)

## 2019-06-26 PROCEDURE — 99232 SBSQ HOSP IP/OBS MODERATE 35: CPT

## 2019-06-26 RX ORDER — INSULIN GLARGINE 100 [IU]/ML
30 INJECTION, SOLUTION SUBCUTANEOUS AT BEDTIME
Refills: 0 | Status: DISCONTINUED | OUTPATIENT
Start: 2019-06-26 | End: 2019-06-29

## 2019-06-26 RX ORDER — INSULIN LISPRO 100/ML
11 VIAL (ML) SUBCUTANEOUS
Refills: 0 | Status: DISCONTINUED | OUTPATIENT
Start: 2019-06-26 | End: 2019-06-29

## 2019-06-26 RX ORDER — HEPARIN SODIUM 5000 [USP'U]/ML
7500 INJECTION INTRAVENOUS; SUBCUTANEOUS EVERY 6 HOURS
Refills: 0 | Status: DISCONTINUED | OUTPATIENT
Start: 2019-06-26 | End: 2019-06-29

## 2019-06-26 RX ORDER — HEPARIN SODIUM 5000 [USP'U]/ML
3500 INJECTION INTRAVENOUS; SUBCUTANEOUS EVERY 6 HOURS
Refills: 0 | Status: DISCONTINUED | OUTPATIENT
Start: 2019-06-26 | End: 2019-06-29

## 2019-06-26 RX ORDER — WARFARIN SODIUM 2.5 MG/1
10 TABLET ORAL ONCE
Refills: 0 | Status: COMPLETED | OUTPATIENT
Start: 2019-06-26 | End: 2019-06-26

## 2019-06-26 RX ORDER — HEPARIN SODIUM 5000 [USP'U]/ML
1100 INJECTION INTRAVENOUS; SUBCUTANEOUS
Qty: 25000 | Refills: 0 | Status: DISCONTINUED | OUTPATIENT
Start: 2019-06-26 | End: 2019-06-29

## 2019-06-26 RX ORDER — WARFARIN SODIUM 2.5 MG/1
6 TABLET ORAL ONCE
Refills: 0 | Status: DISCONTINUED | OUTPATIENT
Start: 2019-06-26 | End: 2019-06-26

## 2019-06-26 RX ADMIN — Medication 9 UNIT(S): at 08:15

## 2019-06-26 RX ADMIN — HEPARIN SODIUM 1400 UNIT(S)/HR: 5000 INJECTION INTRAVENOUS; SUBCUTANEOUS at 02:03

## 2019-06-26 RX ADMIN — HEPARIN SODIUM 1100 UNIT(S)/HR: 5000 INJECTION INTRAVENOUS; SUBCUTANEOUS at 18:33

## 2019-06-26 RX ADMIN — LOSARTAN POTASSIUM 50 MILLIGRAM(S): 100 TABLET, FILM COATED ORAL at 05:32

## 2019-06-26 RX ADMIN — Medication 2: at 08:15

## 2019-06-26 RX ADMIN — WARFARIN SODIUM 10 MILLIGRAM(S): 2.5 TABLET ORAL at 21:11

## 2019-06-26 RX ADMIN — Medication 11 UNIT(S): at 17:10

## 2019-06-26 RX ADMIN — HEPARIN SODIUM 0 UNIT(S)/HR: 5000 INJECTION INTRAVENOUS; SUBCUTANEOUS at 09:34

## 2019-06-26 RX ADMIN — HEPARIN SODIUM 3500 UNIT(S): 5000 INJECTION INTRAVENOUS; SUBCUTANEOUS at 02:07

## 2019-06-26 RX ADMIN — Medication 81 MILLIGRAM(S): at 12:51

## 2019-06-26 RX ADMIN — INSULIN GLARGINE 30 UNIT(S): 100 INJECTION, SOLUTION SUBCUTANEOUS at 21:33

## 2019-06-26 RX ADMIN — Medication 12.5 MILLIGRAM(S): at 18:33

## 2019-06-26 RX ADMIN — Medication 1 TABLET(S): at 12:51

## 2019-06-26 RX ADMIN — Medication 12.5 MILLIGRAM(S): at 05:32

## 2019-06-26 RX ADMIN — Medication 2: at 12:48

## 2019-06-26 RX ADMIN — Medication 11 UNIT(S): at 12:49

## 2019-06-26 RX ADMIN — SIMVASTATIN 20 MILLIGRAM(S): 20 TABLET, FILM COATED ORAL at 21:11

## 2019-06-26 NOTE — PROGRESS NOTE ADULT - PROBLEM SELECTOR PLAN 2
-Agreed with Simvastatin therapy for now. High triglycerides also caused by uncontrolled T2DM.  -Pt to f/u as out pt for need of adjustment or added meds.  -Plan discussed with pt/team.  Contact info: [phone] (24/7). pager [phone]
-C/w Simvastatin therapy for now. High triglycerides also caused by uncontrolled T2DM.  -Pt to f/u as out pt for need of adjustment or added meds.  -Plan discussed with pt/team.  Contact info: [phone] (24/7). pager [phone]
-C/w Simvastatin therapy for now. High triglycerides also caused by uncontrolled T2DM.  -Pt to f/u as out pt for need of adjustment or added meds.  -Plan discussed with pt/team.  Contact info: [phone] (24/7). pager [phone]
-C/w Simvastatin therapy for now. High triglycerides also caused by uncontrolled T2DM.  -Pt to f/u as out pt for need of adjustment or added meds.  -Plan discussed with pt/team.  Contact info: [phone] (24/7). pager [phone]
Discussed with Dr Mitchell.  -Agreed with Simvastatin therapy for now. High triglycerides also caused by uncontrolled T2DM.  -Pt to f/u as out pt for need of adjustment or added meds.  -Plan discussed with pt/team.  Contact info: [phone] (24/7). pager [phone]

## 2019-06-26 NOTE — PROGRESS NOTE ADULT - PROBLEM SELECTOR PLAN 1
-Test BG ac and hs  -Change Lantus dose to 29 units qhs  -Increase Humalog dose to 9-9- 9 units ac meals.  -Continue Humalog low dose correction ac meals and add low dose at hs  -Upon discharge continue basal/bolus therapy as pt agreed and is willing to follow therapy now that he has more knowledge about DM. Doses TBD  -Pt states he lives in [place] and can't come to endo practice at [place]. Can follow with Dr Ott at [address] in [place] Phone: [phone]. Dr Chavira  in [place] [phone]. Gave written info to pt today.  Also pt can ask PCP to refer to endo since pt needs to f/u DM and increased lipids.    -Plan discussed with pt/staff and team.  Contact info: [phone] (24/7). pager [phone]
-Test BG ac and hs  -Change Lantus dose to 30 units qhs  -Increase Humalog dose to 11 units ac meals.  -Continue Humalog low dose correction ac meals and add low dose at hs  -Upon discharge continue basal/bolus therapy as pt agreed and is willing to follow therapy now that he has more knowledge about DM. Doses TBD  -Pt states he lives in [place] and can't come to endo practice at [place]. Can follow with Dr Ott at [address] in [place] Phone: [phone]. Dr Chavira  in [place] [phone]. Gave written info to pt today.  Also pt can ask PCP to refer to endo since pt needs to f/u DM and increased lipids.    -Plan discussed with pt/staff and team.  Contact info: [phone] (24/7). pager [phone]
-Test BG ac and hs  -Continue Lantus 25 units qhs  -Increase Humalog dose to 7 units ac meals.  -Continue Humalog low dose correction ac meals and add low dose at hs  -Upon discharge continue basal/bolus therapy as pt agreed and is willing to follow therapy now that he has more knowledge about DM. Doses TBD  -Can follow with endocrine practice if he wishes at [address] [facility] [address]. Phone . Make apt at time of discharge.  -Plan discussed with pt/team.  Contact info: [phone] (24/7). pager [phone]
-Test BG ac and hs  -Continue Lantus 27 units qhs  -Increase Humalog dose to 9-9- 7 units ac meals.  -Continue Humalog low dose correction ac meals and add low dose at hs  -Upon discharge continue basal/bolus therapy as pt agreed and is willing to follow therapy now that he has more knowledge about DM. Doses TBD  -Pt states he lives in [place] and can't come to endo practice at [place]. Can follow with Dr Ott at [address] in [place] Phone: [phone].  Also pt can ask PCP to refer to endo since pt need to f/u DM and increase lipids.    -Plan discussed with pt/team.  Contact info: [phone] (24/7). pager [phone]
-Test BG ac and hs  -Increase Lantus slightly to 27 units qhs  -Increase Humalog dose to 9/9/7 units ac meals.  -Continue Humalog low dose correction ac meals and add low dose at hs  -Upon discharge continue basal/bolus therapy as pt agreed and is willing to follow therapy now that he has more knowledge about DM. Doses TBD  -Can follow with endocrine practice if he wishes at [address] [facility] [address]. Phone . Make apt at time of discharge.  -Plan discussed with pt

## 2019-06-26 NOTE — PROGRESS NOTE ADULT - SUBJECTIVE AND OBJECTIVE BOX
DIABETES FOLLOW UP NOTE: Saw pt earlier today  INTERVAL HX: 54 y/o M w/h/o uncontrolled T2DM  with variable adherence to meds (Basaglar 25 units q hs plus Janumet 50/1000 bid). Here with L lower back pain irradiating to L lower abdomen found to have kidney stone in bladder. Pt voiding freely without pain. Reports tolerating POs with glycemic control above goal while on present insulin doses (BG 200s). No hypoglycemia. Denies any CP/SOB or LBP at time of visit. Also hypertriglyceridemia on statin. Creat stable. S/p cardiac cath for evaluation of LV thrombus. No CP/SOB reported. Feels well.     Review of Systems:  General: As above  Cardiovascular: No chest pain, palpitations  Respiratory: No SOB, no cough  GI: No nausea, vomiting, abdominal pain  Endocrine: no polyuria, polydipsia or S&Sx of hypoglycemia    Allergies    No Known Allergies    Intolerances      MEDICATIONS    insulin glargine Injectable (LANTUS) 29 Unit(s) SubCutaneous at bedtime  insulin lispro Injectable (HumaLOG) 9 Unit(s) SubCutaneous before dinner  insulin lispro Injectable (HumaLOG) 9 Unit(s) SubCutaneous before breakfast  insulin lispro Injectable (HumaLOG) 9 Unit(s) SubCutaneous before lunch  insulin lispro (HumaLOG) corrective regimen sliding scale   SubCutaneous at bedtime  insulin lispro (HumaLOG) corrective regimen sliding scale   SubCutaneous three times a day before meals  simvastatin 20 milliGRAM(s) Oral at bedtime        PHYSICAL EXAM:  Vital Signs Last 24 Hrs  T(C): 36.6 (06-26-19 @ 11:05), Max: 36.8 (06-25-19 @ 20:28)  T(F): 97.8 (06-26-19 @ 11:05), Max: 98.2 (06-25-19 @ 20:28)  HR: 87 (06-26-19 @ 11:05) (77 - 88)  BP: 120/87 (06-26-19 @ 11:05) (105/71 - 122/79)  BP(mean): --  RR: 17 (06-26-19 @ 11:05) (17 - 18)  SpO2: 100% (06-26-19 @ 11:05) (96% - 100%)  GENERAL: Male sitting in chair in NAD  Abdomen: Soft, nontender, non distended, obese  Extremities: Warm, no edema in all 4 exts.   NEURO: A&O X3    LABS:  POCT Blood Glucose.: 220 mg/dL (06-26-19 @ 11:43)  POCT Blood Glucose.: 203 mg/dL (06-26-19 @ 07:56)  POCT Blood Glucose.: 201 mg/dL (06-25-19 @ 21:22)  POCT Blood Glucose.: 209 mg/dL (06-25-19 @ 16:42)  POCT Blood Glucose.: 290 mg/dL (06-25-19 @ 12:25)  POCT Blood Glucose.: 225 mg/dL (06-25-19 @ 07:57)  POCT Blood Glucose.: 210 mg/dL (06-24-19 @ 21:44)  POCT Blood Glucose.: 168 mg/dL (06-24-19 @ 16:57)                           14.5   8.32  )-----------( 251      ( 26 Jun 2019 10:17 )             45.6     06-26    135  |  102  |  22  ----------------------------<  215<H>  4.9   |  22  |  0.98    Ca    9.6      26 Jun 2019 08:15      Thyroid Function Tests:  06-21 @ 09:11 TSH 1.33 FreeT4 -- T3 -- Anti TPO -- Anti Thyroglobulin Ab -- TSI --      Hemoglobin A1C, Whole Blood: 11.5 % <H> [4.0 - 5.6] (06-20-19 @ 09:26)      06-21 Chol 145 LDL Unable to calculate Test Repeated  LDL Cholesterol (mg/dL) --- Interpretive Comment (for adults 18 and over)  Optimal LDL Level may vary based on clinical situation  Below 70                   Ideal for people at very high risk of heart  disease  Below 100                  Ideal for people at risk of heart disease  100 - 129                    Near [place]  130 - 159                    Borderline high  160 - 189                    High  190 and Above           Very high HDL 23<L> Trig 430<H>

## 2019-06-26 NOTE — PROGRESS NOTE ADULT - SUBJECTIVE AND OBJECTIVE BOX
No events overnight. Denies CP, SOB, palp, dizziness.    MEDICATIONS:  aspirin enteric coated 81 milliGRAM(s) Oral daily  heparin  Infusion. 1200 Unit(s)/Hr IV Continuous <Continuous>  heparin  Injectable 7500 Unit(s) IV Push every 6 hours PRN  heparin  Injectable 3500 Unit(s) IV Push every 6 hours PRN  losartan 50 milliGRAM(s) Oral daily  metoprolol succinate ER 12.5 milliGRAM(s) Oral every 12 hours  dextrose 40% Gel 15 Gram(s) Oral once PRN  dextrose 50% Injectable 12.5 Gram(s) IV Push once  dextrose 50% Injectable 25 Gram(s) IV Push once  dextrose 50% Injectable 25 Gram(s) IV Push once  glucagon  Injectable 1 milliGRAM(s) IntraMuscular once PRN  insulin glargine Injectable (LANTUS) 29 Unit(s) SubCutaneous at bedtime  insulin lispro (HumaLOG) corrective regimen sliding scale   SubCutaneous at bedtime  insulin lispro (HumaLOG) corrective regimen sliding scale   SubCutaneous three times a day before meals  insulin lispro Injectable (HumaLOG) 9 Unit(s) SubCutaneous before dinner  insulin lispro Injectable (HumaLOG) 9 Unit(s) SubCutaneous before breakfast  insulin lispro Injectable (HumaLOG) 9 Unit(s) SubCutaneous before lunch  simvastatin 20 milliGRAM(s) Oral at bedtime    dextrose 5%. 1000 milliLiter(s) IV Continuous <Continuous>  multivitamin 1 Tablet(s) Oral daily  sodium chloride 0.9%. 1000 milliLiter(s) IV Continuous <Continuous>      REVIEW OF SYSTEMS:    CONSTITUTIONAL: No weakness, fevers or chills  EYES/ENT: No visual changes;  No dysphagia  RESPIRATORY: No cough, wheezing, hemoptysis; No shortness of breath  CARDIOVASCULAR: No chest pain or palpitations; No lower extremity edema  GASTROINTESTINAL: No abdominal or epigastric pain. No nausea, vomiting, or hematemesis  GENITOURINARY: No dysuria, frequency or hematuria  NEUROLOGICAL: No numbness or weakness  SKIN: No itching, burning, rashes, or lesions   HEME: No bleeding or bruising  MSK: No joint pains or muscle pains  All other review of systems is negative unless indicated above.    PHYSICAL EXAM:  T(C): 36.7 (06-26-19 @ 04:21), Max: 36.8 (06-25-19 @ 20:28)  HR: 77 (06-26-19 @ 04:21) (77 - 91)  BP: 122/79 (06-26-19 @ 04:21) (102/72 - 148/87)  RR: 18 (06-26-19 @ 04:21) (16 - 18)  SpO2: 100% (06-26-19 @ 04:21) (96% - 100%)  Wt(kg): --  I&O's Summary    25 Jun 2019 07:01  -  26 Jun 2019 07:00  --------------------------------------------------------  IN: 830 mL / OUT: 450 mL / NET: 380 mL        Appearance: Normal	  HEENT:   Normal oral mucosa  Cardiovascular: Normal S1 S2, No JVD, No murmurs, No edema  Respiratory: Lungs clear to auscultation	  Psychiatry: A & O x 3, Mood & affect appropriate  Gastrointestinal:  Soft, Non-tender, + BS	  Skin: No rashes, No ecchymoses, No cyanosis	  Neurologic: Non-focal  Extremities: Normal range of motion, No clubbing, cyanosis        LABS:	 	    CBC Full  -  ( 26 Jun 2019 01:21 )  WBC Count : 9.3 K/uL  Hemoglobin : 14.4 g/dL  Hematocrit : 43.8 %  Platelet Count - Automated : 267 K/uL  Mean Cell Volume : 88.7 fl  Mean Cell Hemoglobin : 29.1 pg  Mean Cell Hemoglobin Concentration : 32.8 gm/dL  Auto Neutrophil # : x  Auto Lymphocyte # : x  Auto Monocyte # : x  Auto Eosinophil # : x  Auto Basophil # : x  Auto Neutrophil % : x  Auto Lymphocyte % : x  Auto Monocyte % : x  Auto Eosinophil % : x  Auto Basophil % : x    06-25    134<L>  |  100  |  24<H>  ----------------------------<  200<H>  4.3   |  22  |  1.09    Ca    9.1      25 Jun 2019 07:01  	  ASSESSMENT/PLAN: 	  54 yo male w h/o CAD (2 stents 6-7 yrs ago), DM, HTN, HLD, former smoker who presented to OSH with abd pain, transferred due to concern for ACS/EKG changes. No evidence of ACS but patient with new echo findings.    1) iCMP, EF 40-45%  - s/p cath w/ open stents (LAD x2), otherwise non-obstructive disease; EF likely chronic from prior LAD infarct  - euvolemic  - c/w asa, statin, losartan  - increase metop tart to 25mg BID  - start lasix 20mg daily PO for maintenance  - d/c amlodipine (relative contraindication in HF)    2) LV Thrombus  - start coumadin w/ hep gtt bridge  - will need to be on coumadin at least 6 mo w/ follow up echo    3) MV Apparatus Abnormalities  - reviewed w/ echo attending - appears to be a redundant chordae, no further work up necessary  	    Braden Lazaro MD  Cardiology Fellow, M-F 7:30A-5P  [phone]    All Cardiology service information can be found on [email or web address], password: [email or web address]. No events overnight. Denies CP, SOB, palp, dizziness.    MEDICATIONS:  aspirin enteric coated 81 milliGRAM(s) Oral daily  heparin  Infusion. 1200 Unit(s)/Hr IV Continuous <Continuous>  heparin  Injectable 7500 Unit(s) IV Push every 6 hours PRN  heparin  Injectable 3500 Unit(s) IV Push every 6 hours PRN  losartan 50 milliGRAM(s) Oral daily  metoprolol succinate ER 12.5 milliGRAM(s) Oral every 12 hours  dextrose 40% Gel 15 Gram(s) Oral once PRN  dextrose 50% Injectable 12.5 Gram(s) IV Push once  dextrose 50% Injectable 25 Gram(s) IV Push once  dextrose 50% Injectable 25 Gram(s) IV Push once  glucagon  Injectable 1 milliGRAM(s) IntraMuscular once PRN  insulin glargine Injectable (LANTUS) 29 Unit(s) SubCutaneous at bedtime  insulin lispro (HumaLOG) corrective regimen sliding scale   SubCutaneous at bedtime  insulin lispro (HumaLOG) corrective regimen sliding scale   SubCutaneous three times a day before meals  insulin lispro Injectable (HumaLOG) 9 Unit(s) SubCutaneous before dinner  insulin lispro Injectable (HumaLOG) 9 Unit(s) SubCutaneous before breakfast  insulin lispro Injectable (HumaLOG) 9 Unit(s) SubCutaneous before lunch  simvastatin 20 milliGRAM(s) Oral at bedtime    dextrose 5%. 1000 milliLiter(s) IV Continuous <Continuous>  multivitamin 1 Tablet(s) Oral daily  sodium chloride 0.9%. 1000 milliLiter(s) IV Continuous <Continuous>      REVIEW OF SYSTEMS:    CONSTITUTIONAL: No weakness, fevers or chills  EYES/ENT: No visual changes;  No dysphagia  RESPIRATORY: No cough, wheezing, hemoptysis; No shortness of breath  CARDIOVASCULAR: No chest pain or palpitations; No lower extremity edema  GASTROINTESTINAL: No abdominal or epigastric pain. No nausea, vomiting, or hematemesis  GENITOURINARY: No dysuria, frequency or hematuria  NEUROLOGICAL: No numbness or weakness  SKIN: No itching, burning, rashes, or lesions   HEME: No bleeding or bruising  MSK: No joint pains or muscle pains  All other review of systems is negative unless indicated above.    PHYSICAL EXAM:  T(C): 36.7 (06-26-19 @ 04:21), Max: 36.8 (06-25-19 @ 20:28)  HR: 77 (06-26-19 @ 04:21) (77 - 91)  BP: 122/79 (06-26-19 @ 04:21) (102/72 - 148/87)  RR: 18 (06-26-19 @ 04:21) (16 - 18)  SpO2: 100% (06-26-19 @ 04:21) (96% - 100%)  Wt(kg): --  I&O's Summary    25 Jun 2019 07:01  -  26 Jun 2019 07:00  --------------------------------------------------------  IN: 830 mL / OUT: 450 mL / NET: 380 mL        Appearance: Normal	  HEENT:   Normal oral mucosa  Cardiovascular: Normal S1 S2, No JVD, No murmurs, No edema  Respiratory: Lungs clear to auscultation	  Psychiatry: A & O x 3, Mood & affect appropriate  Gastrointestinal:  Soft, Non-tender, + BS	  Skin: No rashes, No ecchymoses, No cyanosis	  Neurologic: Non-focal  Extremities: Normal range of motion, No clubbing, cyanosis        LABS:	 	    CBC Full  -  ( 26 Jun 2019 01:21 )  WBC Count : 9.3 K/uL  Hemoglobin : 14.4 g/dL  Hematocrit : 43.8 %  Platelet Count - Automated : 267 K/uL  Mean Cell Volume : 88.7 fl  Mean Cell Hemoglobin : 29.1 pg  Mean Cell Hemoglobin Concentration : 32.8 gm/dL  Auto Neutrophil # : x  Auto Lymphocyte # : x  Auto Monocyte # : x  Auto Eosinophil # : x  Auto Basophil # : x  Auto Neutrophil % : x  Auto Lymphocyte % : x  Auto Monocyte % : x  Auto Eosinophil % : x  Auto Basophil % : x    06-25    134<L>  |  100  |  24<H>  ----------------------------<  200<H>  4.3   |  22  |  1.09    Ca    9.1      25 Jun 2019 07:01  	  ASSESSMENT/PLAN: 	  54 yo male w h/o CAD (2 stents 6-7 yrs ago), DM, HTN, HLD, former smoker who presented to OSH with abd pain, transferred due to concern for ACS/EKG changes. No evidence of ACS but patient with new echo findings.    1) iCMP, EF 40-45%  - s/p cath w/ open stents (LAD x2), otherwise non-obstructive disease; EF likely chronic from prior LAD infarct  - euvolemic  - c/w asa, statin, losartan  - increase metop tart to 25mg BID  - start lasix 20mg daily PO for maintenance  - d/c amlodipine (relative contraindication in HF)    2) LV Thrombus  - start coumadin w/ hep gtt bridge  - will need to be on coumadin at least 6 mo w/ follow up echo    3) MV Apparatus Abnormalities  - reviewed w/ echo attending - appears to be a redundant chordae, no further work up necessary    Okay to d/c when INR is between 2-3 w/ close cardiology follow up. Please call back for any additional questions.    Braden Lazaro MD  Cardiology Fellow, M-F 7:30A-5P  [phone]    All Cardiology service information can be found on [email or web address], password: [email or web address].

## 2019-06-26 NOTE — PROGRESS NOTE ADULT - PROBLEM SELECTOR PROBLEM 2
Hypertriglyceridemia

## 2019-06-27 LAB
APTT BLD: 60.3 SEC — HIGH (ref 27.5–36.3)
GLUCOSE BLDC GLUCOMTR-MCNC: 159 MG/DL — HIGH (ref 70–99)
GLUCOSE BLDC GLUCOMTR-MCNC: 165 MG/DL — HIGH (ref 70–99)
GLUCOSE BLDC GLUCOMTR-MCNC: 186 MG/DL — HIGH (ref 70–99)
GLUCOSE BLDC GLUCOMTR-MCNC: 192 MG/DL — HIGH (ref 70–99)
HCT VFR BLD CALC: 42.8 % — SIGNIFICANT CHANGE UP (ref 39–50)
HCT VFR BLD CALC: 43.3 % — SIGNIFICANT CHANGE UP (ref 39–50)
HGB BLD-MCNC: 13.7 G/DL — SIGNIFICANT CHANGE UP (ref 13–17)
HGB BLD-MCNC: 14.5 G/DL — SIGNIFICANT CHANGE UP (ref 13–17)
INR BLD: 1.24 RATIO — HIGH (ref 0.88–1.16)
MCHC RBC-ENTMCNC: 28.9 PG — SIGNIFICANT CHANGE UP (ref 27–34)
MCHC RBC-ENTMCNC: 30 PG — SIGNIFICANT CHANGE UP (ref 27–34)
MCHC RBC-ENTMCNC: 32 GM/DL — SIGNIFICANT CHANGE UP (ref 32–36)
MCHC RBC-ENTMCNC: 33.5 GM/DL — SIGNIFICANT CHANGE UP (ref 32–36)
MCV RBC AUTO: 89.6 FL — SIGNIFICANT CHANGE UP (ref 80–100)
MCV RBC AUTO: 90.3 FL — SIGNIFICANT CHANGE UP (ref 80–100)
PLATELET # BLD AUTO: 253 K/UL — SIGNIFICANT CHANGE UP (ref 150–400)
PLATELET # BLD AUTO: 254 K/UL — SIGNIFICANT CHANGE UP (ref 150–400)
PROTHROM AB SERPL-ACNC: 14.2 SEC — HIGH (ref 10–13.1)
RBC # BLD: 4.74 M/UL — SIGNIFICANT CHANGE UP (ref 4.2–5.8)
RBC # BLD: 4.83 M/UL — SIGNIFICANT CHANGE UP (ref 4.2–5.8)
RBC # FLD: 12.9 % — SIGNIFICANT CHANGE UP (ref 10.3–14.5)
RBC # FLD: 13.5 % — SIGNIFICANT CHANGE UP (ref 10.3–14.5)
WBC # BLD: 8.72 K/UL — SIGNIFICANT CHANGE UP (ref 3.8–10.5)
WBC # BLD: 9.6 K/UL — SIGNIFICANT CHANGE UP (ref 3.8–10.5)
WBC # FLD AUTO: 8.72 K/UL — SIGNIFICANT CHANGE UP (ref 3.8–10.5)
WBC # FLD AUTO: 9.6 K/UL — SIGNIFICANT CHANGE UP (ref 3.8–10.5)

## 2019-06-27 RX ORDER — WARFARIN SODIUM 2.5 MG/1
10 TABLET ORAL ONCE
Refills: 0 | Status: COMPLETED | OUTPATIENT
Start: 2019-06-27 | End: 2019-06-27

## 2019-06-27 RX ADMIN — Medication 11 UNIT(S): at 17:24

## 2019-06-27 RX ADMIN — HEPARIN SODIUM 1100 UNIT(S)/HR: 5000 INJECTION INTRAVENOUS; SUBCUTANEOUS at 01:36

## 2019-06-27 RX ADMIN — SIMVASTATIN 20 MILLIGRAM(S): 20 TABLET, FILM COATED ORAL at 22:15

## 2019-06-27 RX ADMIN — Medication 1: at 12:21

## 2019-06-27 RX ADMIN — Medication 1 TABLET(S): at 12:21

## 2019-06-27 RX ADMIN — Medication 1: at 08:15

## 2019-06-27 RX ADMIN — Medication 11 UNIT(S): at 12:20

## 2019-06-27 RX ADMIN — Medication 12.5 MILLIGRAM(S): at 17:24

## 2019-06-27 RX ADMIN — LOSARTAN POTASSIUM 50 MILLIGRAM(S): 100 TABLET, FILM COATED ORAL at 05:29

## 2019-06-27 RX ADMIN — Medication 12.5 MILLIGRAM(S): at 05:29

## 2019-06-27 RX ADMIN — Medication 11 UNIT(S): at 08:15

## 2019-06-27 RX ADMIN — Medication 81 MILLIGRAM(S): at 12:20

## 2019-06-27 RX ADMIN — WARFARIN SODIUM 10 MILLIGRAM(S): 2.5 TABLET ORAL at 22:15

## 2019-06-27 RX ADMIN — Medication 1: at 17:23

## 2019-06-27 RX ADMIN — INSULIN GLARGINE 30 UNIT(S): 100 INJECTION, SOLUTION SUBCUTANEOUS at 22:15

## 2019-06-28 ENCOUNTER — TRANSCRIPTION ENCOUNTER (OUTPATIENT)
Age: 54
End: 2019-06-28

## 2019-06-28 LAB
APTT BLD: 105.2 SEC — HIGH (ref 27.5–36.3)
APTT BLD: 42.3 SEC — HIGH (ref 27.5–36.3)
APTT BLD: 71.3 SEC — HIGH (ref 27.5–36.3)
GLUCOSE BLDC GLUCOMTR-MCNC: 169 MG/DL — HIGH (ref 70–99)
GLUCOSE BLDC GLUCOMTR-MCNC: 204 MG/DL — HIGH (ref 70–99)
GLUCOSE BLDC GLUCOMTR-MCNC: 221 MG/DL — HIGH (ref 70–99)
GLUCOSE BLDC GLUCOMTR-MCNC: 230 MG/DL — HIGH (ref 70–99)
HCT VFR BLD CALC: 42.5 % — SIGNIFICANT CHANGE UP (ref 39–50)
HGB BLD-MCNC: 14 G/DL — SIGNIFICANT CHANGE UP (ref 13–17)
INR BLD: 1.66 RATIO — HIGH (ref 0.88–1.16)
MCHC RBC-ENTMCNC: 29.4 PG — SIGNIFICANT CHANGE UP (ref 27–34)
MCHC RBC-ENTMCNC: 32.9 GM/DL — SIGNIFICANT CHANGE UP (ref 32–36)
MCV RBC AUTO: 89.3 FL — SIGNIFICANT CHANGE UP (ref 80–100)
PLATELET # BLD AUTO: 258 K/UL — SIGNIFICANT CHANGE UP (ref 150–400)
PROTHROM AB SERPL-ACNC: 19 SEC — HIGH (ref 10–13.1)
RBC # BLD: 4.76 M/UL — SIGNIFICANT CHANGE UP (ref 4.2–5.8)
RBC # FLD: 13.4 % — SIGNIFICANT CHANGE UP (ref 10.3–14.5)
WBC # BLD: 8.49 K/UL — SIGNIFICANT CHANGE UP (ref 3.8–10.5)
WBC # FLD AUTO: 8.49 K/UL — SIGNIFICANT CHANGE UP (ref 3.8–10.5)

## 2019-06-28 RX ORDER — WARFARIN SODIUM 2.5 MG/1
7.5 TABLET ORAL ONCE
Refills: 0 | Status: COMPLETED | OUTPATIENT
Start: 2019-06-28 | End: 2019-06-28

## 2019-06-28 RX ADMIN — Medication 12.5 MILLIGRAM(S): at 05:29

## 2019-06-28 RX ADMIN — INSULIN GLARGINE 30 UNIT(S): 100 INJECTION, SOLUTION SUBCUTANEOUS at 21:25

## 2019-06-28 RX ADMIN — HEPARIN SODIUM 1100 UNIT(S)/HR: 5000 INJECTION INTRAVENOUS; SUBCUTANEOUS at 17:05

## 2019-06-28 RX ADMIN — Medication 12.5 MILLIGRAM(S): at 17:02

## 2019-06-28 RX ADMIN — Medication 1: at 08:12

## 2019-06-28 RX ADMIN — Medication 11 UNIT(S): at 12:10

## 2019-06-28 RX ADMIN — Medication 11 UNIT(S): at 17:02

## 2019-06-28 RX ADMIN — WARFARIN SODIUM 7.5 MILLIGRAM(S): 2.5 TABLET ORAL at 21:25

## 2019-06-28 RX ADMIN — HEPARIN SODIUM 1300 UNIT(S)/HR: 5000 INJECTION INTRAVENOUS; SUBCUTANEOUS at 10:35

## 2019-06-28 RX ADMIN — Medication 2: at 17:02

## 2019-06-28 RX ADMIN — Medication 81 MILLIGRAM(S): at 12:10

## 2019-06-28 RX ADMIN — LOSARTAN POTASSIUM 50 MILLIGRAM(S): 100 TABLET, FILM COATED ORAL at 05:29

## 2019-06-28 RX ADMIN — SIMVASTATIN 20 MILLIGRAM(S): 20 TABLET, FILM COATED ORAL at 21:25

## 2019-06-28 RX ADMIN — Medication 1 TABLET(S): at 12:10

## 2019-06-28 RX ADMIN — HEPARIN SODIUM 3500 UNIT(S): 5000 INJECTION INTRAVENOUS; SUBCUTANEOUS at 10:36

## 2019-06-28 RX ADMIN — Medication 11 UNIT(S): at 08:12

## 2019-06-28 RX ADMIN — HEPARIN SODIUM 1100 UNIT(S)/HR: 5000 INJECTION INTRAVENOUS; SUBCUTANEOUS at 23:59

## 2019-06-28 RX ADMIN — Medication 2: at 12:10

## 2019-06-28 NOTE — PROGRESS NOTE ADULT - REASON FOR ADMISSION
abd pain
Lower back pain> kidney stone

## 2019-06-28 NOTE — DISCHARGE NOTE NURSING/CASE MANAGEMENT/SOCIAL WORK - NSDCDPATPORTLINK_GEN_ALL_CORE
You can access the [employer] [facility] Patient Portal, offered by [facility], by registering with the following website: [email or web address]

## 2019-06-29 ENCOUNTER — TRANSCRIPTION ENCOUNTER (OUTPATIENT)
Age: 54
End: 2019-06-29

## 2019-06-29 VITALS
SYSTOLIC BLOOD PRESSURE: 112 MMHG | OXYGEN SATURATION: 95 % | HEART RATE: 79 BPM | TEMPERATURE: 98 F | RESPIRATION RATE: 17 BRPM | DIASTOLIC BLOOD PRESSURE: 75 MMHG

## 2019-06-29 LAB
APTT BLD: 63.4 SEC — HIGH (ref 27.5–36.3)
GLUCOSE BLDC GLUCOMTR-MCNC: 156 MG/DL — HIGH (ref 70–99)
GLUCOSE BLDC GLUCOMTR-MCNC: 187 MG/DL — HIGH (ref 70–99)
HCT VFR BLD CALC: 43.3 % — SIGNIFICANT CHANGE UP (ref 39–50)
HGB BLD-MCNC: 14 G/DL — SIGNIFICANT CHANGE UP (ref 13–17)
INR BLD: 2.17 RATIO — HIGH (ref 0.88–1.16)
MCHC RBC-ENTMCNC: 28.8 PG — SIGNIFICANT CHANGE UP (ref 27–34)
MCHC RBC-ENTMCNC: 32.3 GM/DL — SIGNIFICANT CHANGE UP (ref 32–36)
MCV RBC AUTO: 89.1 FL — SIGNIFICANT CHANGE UP (ref 80–100)
PLATELET # BLD AUTO: 265 K/UL — SIGNIFICANT CHANGE UP (ref 150–400)
PROTHROM AB SERPL-ACNC: 25.5 SEC — HIGH (ref 10–12.9)
RBC # BLD: 4.86 M/UL — SIGNIFICANT CHANGE UP (ref 4.2–5.8)
RBC # FLD: 13.2 % — SIGNIFICANT CHANGE UP (ref 10.3–14.5)
WBC # BLD: 8.18 K/UL — SIGNIFICANT CHANGE UP (ref 3.8–10.5)
WBC # FLD AUTO: 8.18 K/UL — SIGNIFICANT CHANGE UP (ref 3.8–10.5)

## 2019-06-29 PROCEDURE — 82435 ASSAY OF BLOOD CHLORIDE: CPT

## 2019-06-29 PROCEDURE — C1769: CPT

## 2019-06-29 PROCEDURE — 93454 CORONARY ARTERY ANGIO S&I: CPT

## 2019-06-29 PROCEDURE — 76775 US EXAM ABDO BACK WALL LIM: CPT

## 2019-06-29 PROCEDURE — 83036 HEMOGLOBIN GLYCOSYLATED A1C: CPT

## 2019-06-29 PROCEDURE — 84132 ASSAY OF SERUM POTASSIUM: CPT

## 2019-06-29 PROCEDURE — 82962 GLUCOSE BLOOD TEST: CPT

## 2019-06-29 PROCEDURE — 82947 ASSAY GLUCOSE BLOOD QUANT: CPT

## 2019-06-29 PROCEDURE — 74174 CTA ABD&PLVS W/CONTRAST: CPT

## 2019-06-29 PROCEDURE — 84443 ASSAY THYROID STIM HORMONE: CPT

## 2019-06-29 PROCEDURE — 85027 COMPLETE CBC AUTOMATED: CPT

## 2019-06-29 PROCEDURE — 83605 ASSAY OF LACTIC ACID: CPT

## 2019-06-29 PROCEDURE — 84295 ASSAY OF SERUM SODIUM: CPT

## 2019-06-29 PROCEDURE — 84100 ASSAY OF PHOSPHORUS: CPT

## 2019-06-29 PROCEDURE — 71275 CT ANGIOGRAPHY CHEST: CPT

## 2019-06-29 PROCEDURE — 76770 US EXAM ABDO BACK WALL COMP: CPT

## 2019-06-29 PROCEDURE — 83690 ASSAY OF LIPASE: CPT

## 2019-06-29 PROCEDURE — C1894: CPT

## 2019-06-29 PROCEDURE — 93306 TTE W/DOPPLER COMPLETE: CPT

## 2019-06-29 PROCEDURE — 82330 ASSAY OF CALCIUM: CPT

## 2019-06-29 PROCEDURE — 86803 HEPATITIS C AB TEST: CPT

## 2019-06-29 PROCEDURE — 80053 COMPREHEN METABOLIC PANEL: CPT

## 2019-06-29 PROCEDURE — 82803 BLOOD GASES ANY COMBINATION: CPT

## 2019-06-29 PROCEDURE — 81001 URINALYSIS AUTO W/SCOPE: CPT

## 2019-06-29 PROCEDURE — 82553 CREATINE MB FRACTION: CPT

## 2019-06-29 PROCEDURE — 84484 ASSAY OF TROPONIN QUANT: CPT

## 2019-06-29 PROCEDURE — 86850 RBC ANTIBODY SCREEN: CPT

## 2019-06-29 PROCEDURE — 93005 ELECTROCARDIOGRAM TRACING: CPT

## 2019-06-29 PROCEDURE — 85610 PROTHROMBIN TIME: CPT

## 2019-06-29 PROCEDURE — 85014 HEMATOCRIT: CPT

## 2019-06-29 PROCEDURE — 87040 BLOOD CULTURE FOR BACTERIA: CPT

## 2019-06-29 PROCEDURE — 83735 ASSAY OF MAGNESIUM: CPT

## 2019-06-29 PROCEDURE — 80048 BASIC METABOLIC PNL TOTAL CA: CPT

## 2019-06-29 PROCEDURE — 87086 URINE CULTURE/COLONY COUNT: CPT

## 2019-06-29 PROCEDURE — 86901 BLOOD TYPING SEROLOGIC RH(D): CPT

## 2019-06-29 PROCEDURE — 99152 MOD SED SAME PHYS/QHP 5/>YRS: CPT

## 2019-06-29 PROCEDURE — 82550 ASSAY OF CK (CPK): CPT

## 2019-06-29 PROCEDURE — 85730 THROMBOPLASTIN TIME PARTIAL: CPT

## 2019-06-29 PROCEDURE — C1887: CPT

## 2019-06-29 PROCEDURE — 99285 EMERGENCY DEPT VISIT HI MDM: CPT | Mod: 25

## 2019-06-29 PROCEDURE — 71045 X-RAY EXAM CHEST 1 VIEW: CPT

## 2019-06-29 PROCEDURE — 80061 LIPID PANEL: CPT

## 2019-06-29 PROCEDURE — 86900 BLOOD TYPING SEROLOGIC ABO: CPT

## 2019-06-29 RX ORDER — METOPROLOL TARTRATE 50 MG
0.5 TABLET ORAL
Qty: 30 | Refills: 0
Start: 2019-06-29 | End: 2019-07-28

## 2019-06-29 RX ORDER — AMLODIPINE BESYLATE 2.5 MG/1
1 TABLET ORAL
Qty: 0 | Refills: 0 | DISCHARGE

## 2019-06-29 RX ORDER — WARFARIN SODIUM 2.5 MG/1
1 TABLET ORAL
Qty: 14 | Refills: 0
Start: 2019-06-29 | End: 2019-07-12

## 2019-06-29 RX ORDER — LOSARTAN POTASSIUM 100 MG/1
1 TABLET, FILM COATED ORAL
Qty: 0 | Refills: 0 | DISCHARGE
Start: 2019-06-29

## 2019-06-29 RX ADMIN — Medication 11 UNIT(S): at 12:18

## 2019-06-29 RX ADMIN — Medication 1 TABLET(S): at 12:19

## 2019-06-29 RX ADMIN — Medication 1: at 08:20

## 2019-06-29 RX ADMIN — HEPARIN SODIUM 1100 UNIT(S)/HR: 5000 INJECTION INTRAVENOUS; SUBCUTANEOUS at 08:21

## 2019-06-29 RX ADMIN — Medication 11 UNIT(S): at 08:19

## 2019-06-29 RX ADMIN — Medication 12.5 MILLIGRAM(S): at 05:53

## 2019-06-29 RX ADMIN — LOSARTAN POTASSIUM 50 MILLIGRAM(S): 100 TABLET, FILM COATED ORAL at 05:53

## 2019-06-29 RX ADMIN — Medication 81 MILLIGRAM(S): at 12:19

## 2019-06-29 RX ADMIN — Medication 1: at 12:19

## 2019-06-29 NOTE — PROGRESS NOTE ADULT - PROVIDER SPECIALTY LIST ADULT
Cardiology
Endocrinology
Internal Medicine
Cardiology
Internal Medicine
Endocrinology

## 2019-06-29 NOTE — PROGRESS NOTE ADULT - ASSESSMENT
_________________________________________________________________________________________  ========>>  M E D I C A L   A T T E N D I N G    F O L L O W  U P  N O T E  <<=========  -----------------------------------------------------------------------------------------------------    - Patient seen and examined by me approximately sixty minutes ago.   - In summary,  ELIZABETH LIPSCOMB is a 53y year old man who originally presented with lower abd pain   - Patient today overall doing ok, comfortable, eating OK. overall feels better      no CP, no palpitation no SOB, no GI /  c/o          noted episode of low BP and discussed whiz NP     ==================>> REVIEW OF SYSTEM <<=================    GEN: no fever, no chills, no pain  RESP: no SOB, no cough, no sputum  CVS: no chest pain, no palpitations, no edema  GI: no abdominal pain, no nausea, no constipation, no diarrhea  : + norris   Neuro: no headache, no dizziness  Derm : no itching, no rash    ==================>> PHYSICAL EXAM <<=================    GEN: A&O X 3 , NAD , comfortable in bed post breakfast   HEENT: NCAT, PERRL, MMM, hearing intact  Neck: supple , no JVD  CVS: S1S2 , regular , No M/R/G appreciated  PULM: CTA B/L,  no W/R/R appreciated  ABD.: soft. non tender, non distended,  bowel sounds present, obese  Extrem: intact pulses , no edema      norris in place and clear, nonbloody   PSYCH : normal mood,  not anxious      ==================>> MEDICATIONS <<====================    MEDICATIONS  (STANDING):  amLODIPine   Tablet 5 milliGRAM(s) Oral daily  aspirin enteric coated 81 milliGRAM(s) Oral daily  dextrose 5%. 1000 milliLiter(s) (50 mL/Hr) IV Continuous <Continuous>  dextrose 50% Injectable 12.5 Gram(s) IV Push once  dextrose 50% Injectable 25 Gram(s) IV Push once  dextrose 50% Injectable 25 Gram(s) IV Push once  heparin  Injectable 5000 Unit(s) SubCutaneous every 8 hours  insulin glargine Injectable (LANTUS) 25 Unit(s) SubCutaneous at bedtime  insulin lispro (HumaLOG) corrective regimen sliding scale   SubCutaneous three times a day before meals  insulin lispro Injectable (HumaLOG) 5 Unit(s) SubCutaneous three times a day before meals  losartan 25 milliGRAM(s) Oral daily  multivitamin 1 Tablet(s) Oral daily  simvastatin 20 milliGRAM(s) Oral at bedtime  sodium chloride 0.9%. 1000 milliLiter(s) (100 mL/Hr) IV Continuous <Continuous>    MEDICATIONS  (PRN):  dextrose 40% Gel 15 Gram(s) Oral once PRN Blood Glucose LESS THAN 70 milliGRAM(s)/deciliter  glucagon  Injectable 1 milliGRAM(s) IntraMuscular once PRN Glucose LESS THAN 70 milligrams/deciliter    ==================>> VITAL SIGNS <<==================    T(C): 36.6 (19 @ 04:14), Max: 37.1 (19 @ 15:27)  HR: 91 (19 @ 04:14) (91 - 110)  BP: 95/65 (19 @ 04:14) (68/40 - 173/100)  RR: 20 (19 @ 04:14) ( - 26)  SpO2: 96% (19 @ 04:14) (96% - 99%)    POCT Blood Glucose.: 229 mg/dL (2019 07:51)  POCT Blood Glucose.: 202 mg/dL (2019 21:19)  POCT Blood Glucose.: 296 mg/dL (2019 16:35)  POCT Blood Glucose.: 273 mg/dL (2019 13:00)  POCT Blood Glucose.: 304 mg/dL (2019 10:18)    I&O's Summary    2019 07:  -  2019 07:00  --------------------------------------------------------  IN: 120 mL / OUT: 1800 mL / NET: -1680 mL    2019 07:01  -  2019 09:57  --------------------------------------------------------  IN: 420 mL / OUT: 350 mL / NET: 70 mL       ==================>> LAB AND IMAGING <<==================                        15.0   10.97 )-----------( 232      ( 2019 09:23 )             45.0        WBC count:   10.97 <<== ,  13.0 <<== ,  14.3 <<==     135  |  100  |  42<H>  ----------------------------<  202<H>  3.9   |  22  |  2.43<H>    Ca    8.8      2019 05:37  Phos  5.4       Mg     2.5         TPro  6.8  /  Alb  3.2<L>  /  TBili  0.4  /  DBili  x   /  AST  16  /  ALT  20  /  AlkPhos  80  06-21    PT/INR - ( 2019 05:17 )   PT: 11.2 sec;   INR: 0.98 ratio    PTT - ( 2019 18:25 )  PTT:34.2 sec              ~~ High Sensitivity Troponin  ~~  86  <<--, 182  <<--, 196  <<--     Urinalysis Basic - ( 2019 06:00 )  Color: Colorless / Appearance: Clear / S.024 / pH: x  Gluc: x / Ketone: Small  / Bili: Negative / Urobili: Negative   Blood: x / Protein: 100 mg/dL / Nitrite: Negative   Leuk Esterase: Negative / RBC: 749 /hpf / WBC 2 /HPF   Sq Epi: x / Non Sq Epi: 1 /hpf / Bacteria: Negative    TSH:  P    Lipid profile:  P    HgA1C: 11.5  (19)            _______________________  C U L T U R E S :    Culture - Urine (collected 2019 10:13)  Source: .Urine  Final Report (2019 02:34):    No growth  ___________________________________________________________________________________  ===============>>  A S S E S S M E N T   A N D   P L A N <<===============  ------------------------------------------------------------------------------------------    Problem/Plan - 1:  ·  Problem: Abnormal EKG.  Plan: appreciated cardiology consult  pt off heparin drip per cardio   echo ordered, pending  continue aspirin, statin  ? not on Beta blocker  tele monitoring  supportive care  further mgmt per cardio.     Problem/Plan - 2:  ·  Problem: Troponin level elevated in pt with known CAD   likely due to demand ischemia   aspirin, heparin, statin  echo.   cardio f/u    Problem/Plan - 3:  ·  Problem: MARTIN   likely due to post obstructive uropathy and possible mild ATN due to hypotension  started IVH for 12 hrs  recheck BMP this afternoon  if not better / stable ,. renal consult   check renal sono tomorrow    Problem/Plan - 4:  ·  Problem: Other hydronephrosis.        per CT scan likely due to passed stone ( stone seen in bladder)   monitor renal function ( unclear if pt with MARTIN here of CKD at baseline)  encourage PO hydration.   sono as above tomorrow to monitor   IVH as above    Problem/Plan - 5:  ·  Problem: Urethral stone.    as above : per CT scan likely due to passed stone ( stone seen in bladder).   should follow up with urology as OP    Problem/Plan - 6:  Problem: Gross hematuria: due to above:  >> resolved   monitor   keep norris for now until pt better and OOB / ambulatory.    Problem/Plan - 7:  ·  Problem: Type 2 diabetes mellitus : poorly controlled DM with elevated A1C  Endocrine consult appreciated   continue long / short acting meds  monitor FS  discussed with pt re diet and weight loss.   nutrition consult and diabetic educator  consult     Problem/Plan - 8:  ·  Problem: Essential hypertension with episode of hypotension last night   on home meds  monitor closely  cardio f/u. and adjustment of meds as needed     Problem/Plan - 9:  ·  Problem: Hyperlipidemia, unspecified hyperlipidemia type.  Plan: statin  lipid profile.     Problem/Plan - 10:  Problem: Obesity (BMI 30.0-34.9). Plan; discussed with pt re diet and weight loss  needs sleep study as OP as seen snoring ...    -GI/DVT Prophylaxis.    --------------------------------------------  Case discussed with pt, staff  Education given on findings and plan of care  ___________________________  HMelida De León D.O.  Pager: [phone]
M E D I C A L   A T T E N D I N G    F O L L O W    U P   N O T E                                     ------------------------------------------------------------------------------------------------    patient evaluated by me, case discussed with team, chart, medications, and physical exam reviewed, labs / tests  and vitals reviewed by me, as jenae.   Patient is stable for discharge today.  Patient to follow up with  PMD, cardio clinic adn others as noted before   See discharge document for full note.      ==================>> MEDICATIONS <<====================    aspirin enteric coated 81 milliGRAM(s) Oral daily  dextrose 5%. 1000 milliLiter(s) IV Continuous <Continuous>  dextrose 50% Injectable 12.5 Gram(s) IV Push once  dextrose 50% Injectable 25 Gram(s) IV Push once  dextrose 50% Injectable 25 Gram(s) IV Push once  heparin  Infusion. 1100 Unit(s)/Hr IV Continuous <Continuous>  insulin glargine Injectable (LANTUS) 30 Unit(s) SubCutaneous at bedtime  insulin lispro (HumaLOG) corrective regimen sliding scale   SubCutaneous at bedtime  insulin lispro (HumaLOG) corrective regimen sliding scale   SubCutaneous three times a day before meals  insulin lispro Injectable (HumaLOG) 11 Unit(s) SubCutaneous before breakfast  insulin lispro Injectable (HumaLOG) 11 Unit(s) SubCutaneous before lunch  insulin lispro Injectable (HumaLOG) 11 Unit(s) SubCutaneous before dinner  losartan 50 milliGRAM(s) Oral daily  metoprolol succinate ER 12.5 milliGRAM(s) Oral every 12 hours  multivitamin 1 Tablet(s) Oral daily  simvastatin 20 milliGRAM(s) Oral at bedtime  sodium chloride 0.9%. 1000 milliLiter(s) IV Continuous <Continuous>    MEDICATIONS  (PRN):  dextrose 40% Gel 15 Gram(s) Oral once PRN Blood Glucose LESS THAN 70 milliGRAM(s)/deciliter  glucagon  Injectable 1 milliGRAM(s) IntraMuscular once PRN Glucose LESS THAN 70 milligrams/deciliter  heparin  Injectable 7500 Unit(s) IV Push every 6 hours PRN For aPTT less than 40  heparin  Injectable 3500 Unit(s) IV Push every 6 hours PRN For aPTT between 40 - 57    ==================>> VITAL SIGNS <<==================    T(C): 36.6 (06-29-19 @ 11:07), Max: 36.8 (06-29-19 @ 04:32)  HR: 79 (06-29-19 @ 11:07) (72 - 79)  BP: 112/75 (06-29-19 @ 11:07) (100/67 - 128/84)  RR: 17 (06-29-19 @ 11:07) (16 - 18)  SpO2: 95% (06-29-19 @ 11:07) (95% - 98%)    POCT Blood Glucose.: 156 mg/dL (29 Jun 2019 07:39)  POCT Blood Glucose.: 204 mg/dL (28 Jun 2019 21:10)  POCT Blood Glucose.: 221 mg/dL (28 Jun 2019 16:40)  POCT Blood Glucose.: 230 mg/dL (28 Jun 2019 11:52)    I&O's Summary    28 Jun 2019 07:01  -  29 Jun 2019 07:00  --------------------------------------------------------  IN: 806 mL / OUT: 250 mL / NET: 556 mL       ==================>> LAB AND IMAGING <<==================                        14.0   8.18  )-----------( 265      ( 29 Jun 2019 09:33 )             43.3        PT/INR - ( 29 Jun 2019 07:01 )   PT: 25.5 sec;   INR: 2.17 ratio    PTT - ( 29 Jun 2019 07:01 )  PTT:63.4 sec                 TSH:      1.33   (06-21-19)           Lipid profile:  (06-21-19)     Total: 145     LDL  : Unable to calculate Test Repeated  LDL Cholesterol (mg/dL) --- Interpretive Comment (for adults 18 and over)  Optimal LDL Level may vary based on clinical situation  Below 70                   Ideal for people at very high risk of heart  disease  Below 100                  Ideal for people at risk of heart disease  100 - 129                    [place]  130 - 159                    Borderline high  160 - 189                    High  190 and Above           Very high     HDL  :23     TG   :430     HgA1C: 11.5  (06-20-19)            WBC count:   8.18 <<== ,  8.49 <<== ,  8.72 <<== ,  9.6 <<== ,  8.32 <<== ,  9.3 <<==   Hemoglobin:   14.0 <<==,  14.0 <<==,  13.7 <<==,  14.5 <<==,  14.5 <<==,  14.4 <<==  platelets:  265 <==, 258 <==, 253 <==, 254 <==, 251 <==, 267 <==, 251 <==    Creatinine:  0.98  <<==, 1.09  <<==, 1.01  <<==  Sodium:   135  <==, 134  <==, 135  <==    I N R :  2.17  <<==, 1.66  <<==, 1.24  <<==, 1.05  <<==, 0.99  <<==
_________________________________________________________________________________________  ========>>  M E D I C A L   A T T E N D I N G    F O L L O W  U P  N O T E  <<=========  -----------------------------------------------------------------------------------------------------    - Patient seen and examined by me earlier today.   - In summary,  ELIZABETH LIPSCOMB is a 53y year old man who originally presented with lower abd pain   - Patient today overall doing ok, comfortable, eating OK. overall feels better      no CP, no palpitation no SOB, no GI /  c/o     ==================>> REVIEW OF SYSTEM <<=================    GEN: no fever, no chills, no pain  RESP: no SOB, no cough, no sputum  CVS: no chest pain, no palpitations, no edema  GI: no abdominal pain, no nausea, no constipation, no diarrhea  : + norris >> trial of void to be done today   Neuro: no headache, no dizziness  Derm : no itching, no rash    ==================>> PHYSICAL EXAM <<=================    GEN: A&O X 3 , NAD , comfortable in bed post breakfast   HEENT: NCAT, PERRL, MMM, hearing intact  Neck: supple , no JVD  CVS: S1S2 , regular , No M/R/G appreciated  PULM: CTA B/L,  no W/R/R appreciated  ABD.: soft. non tender, non distended,  bowel sounds present, obese  Extrem: intact pulses , no edema   PSYCH : normal mood,  not anxious      ==================>> MEDICATIONS <<====================    amLODIPine   Tablet 5 milliGRAM(s) Oral daily  aspirin enteric coated 81 milliGRAM(s) Oral daily  dextrose 5%. 1000 milliLiter(s) IV Continuous <Continuous>  dextrose 50% Injectable 12.5 Gram(s) IV Push once  dextrose 50% Injectable 25 Gram(s) IV Push once  dextrose 50% Injectable 25 Gram(s) IV Push once  heparin  Injectable 5000 Unit(s) SubCutaneous every 8 hours  insulin glargine Injectable (LANTUS) 25 Unit(s) SubCutaneous at bedtime  insulin lispro (HumaLOG) corrective regimen sliding scale   SubCutaneous three times a day before meals  insulin lispro (HumaLOG) corrective regimen sliding scale   SubCutaneous at bedtime  insulin lispro Injectable (HumaLOG) 7 Unit(s) SubCutaneous three times a day before meals  losartan 25 milliGRAM(s) Oral daily  multivitamin 1 Tablet(s) Oral daily  simvastatin 20 milliGRAM(s) Oral at bedtime  sodium chloride 0.9%. 1000 milliLiter(s) IV Continuous <Continuous>    MEDICATIONS  (PRN):  dextrose 40% Gel 15 Gram(s) Oral once PRN Blood Glucose LESS THAN 70 milliGRAM(s)/deciliter  glucagon  Injectable 1 milliGRAM(s) IntraMuscular once PRN Glucose LESS THAN 70 milligrams/deciliter    ==================>> VITAL SIGNS <<==================    T(C): 36.5 (06-22-19 @ 12:03), Max: 36.5 (06-21-19 @ 20:40)  HR: 89 (06-22-19 @ 12:03) (82 - 89)  BP: 107/69 (06-22-19 @ 12:03) (107/69 - 130/83)  RR: 18 (06-22-19 @ 12:03) (17 - 18)  SpO2: 98% (06-22-19 @ 12:03) (97% - 99%)    POCT Blood Glucose.: 224 mg/dL (22 Jun 2019 12:02)  POCT Blood Glucose.: 186 mg/dL (22 Jun 2019 08:10)  POCT Blood Glucose.: 161 mg/dL (21 Jun 2019 21:22)  POCT Blood Glucose.: 198 mg/dL (21 Jun 2019 16:29)    I&O's Summary    21 Jun 2019 07:01  -  22 Jun 2019 07:00  --------------------------------------------------------  IN: 1140 mL / OUT: 2100 mL / NET: -960 mL    22 Jun 2019 07:01  -  22 Jun 2019 14:17  --------------------------------------------------------  IN: 480 mL / OUT: 0 mL / NET: 480 mL     ==================>> LAB AND IMAGING <<==================                        14.3   8.23  )-----------( 221      ( 22 Jun 2019 10:51 )             44.8        WBC count:   8.23 <<== ,  10.97 <<== ,  13.0 <<== ,  14.3 <<==     138  |  106  |  32<H>  ----------------------------<  185<H>  4.2   |  22  |  1.20    Ca    8.5      22 Jun 2019 05:34  Phos  5.4     06-21  Mg     2.5     06-21    TPro  6.9  /  Alb  3.2<L>  /  TBili  0.3  /  DBili  x   /  AST  20  /  ALT  20  /  AlkPhos  92  06-22  PTT - ( 20 Jun 2019 18:25 )  PTT:34.2 sec              ~~ High Sensitivity Troponin  ~~  86  <<--, 182  <<--, 196  <<--     TSH:      1.33   (06-21-19)           Lipid profile:  (06-21-19)     Total: 145     LDL  : Unable to calculate Test Repeated  LDL Cholesterol (mg/dL) --- Interpretive Comment (for adults 18 and over)  Optimal LDL Level may vary based on clinical situation  Below 70                   Ideal for people at very high risk of heart  disease  Below 100                  Ideal for people at risk of heart disease  100 - 129                    [place]  130 - 159                    Borderline high  160 - 189                    High  190 and Above           Very high     HDL  :23     TG   :430     HgA1C: 11.5  (06-20-19)            WBC count:   8.23 <<== ,  10.97 <<== ,  13.0 <<== ,  14.3 <<==   Hemoglobin:   14.3 <<==,  15.0 <<==,  15.9 <<==,  16.5 <<==  platelets:  221 <==, 232 <==, 243 <==, 264 <==    Creatinine:  1.20  <<==, 1.63  <<==, 1.21  <<==, 2.43  <<==, 1.66  <<==  Sodium:   138  <==, 139  <==, 145  <==, 135  <==, 134  <==       AST:          20 <== , 16 <== , 23 <==      ALT:        20  <== , 20  <== , 25  <==      AP:        92  <=, 80  <=, 108  <=     Bili:        0.3  <=, 0.4  <=, 0.5  <=    _______________________  C U L T U R E S :    Culture - Urine (collected 20 Jun 2019 10:13)  Source: .Urine  Final Report (21 Jun 2019 02:34):    No growth  ___________________________________________________________________________________  ===============>>  A S S E S S M E N T   A N D   P L A N <<===============  ------------------------------------------------------------------------------------------    Problem/Plan - 1:  ·  Problem: Abnormal EKG.  Plan: appreciated cardiology consult  pt endorses no symptoms  echo ordered, pending  continue aspirin, statin>> statin doubled   ? not on Beta blocker  tele monitoring  supportive care  further mgmt per cardio.     Problem/Plan - 2:  ·  Problem: Troponin level elevated in pt with known CAD   likely due to demand ischemia   aspirin, heparin, statin as above  echo. as above  cardio f/u    Problem/Plan - 3:  ·  Problem: MARTIN   likely due to post obstructive uropathy and possible mild ATN due to hypotension  post IVH for 12 hrs >> improved   monitor labs   check renal sono , pending  trial of void being done today     Problem/Plan - 4:  ·  Problem: Other hydronephrosis.        per CT scan likely due to passed stone ( stone seen in bladder)   monitor renal function as back to normal range   encourage PO hydration.   sono as above , pending   IVH as above    Problem/Plan - 5:  ·  Problem: Urethral stone.    as above : per CT scan likely due to passed stone ( stone seen in bladder).   should follow up with urology as OP    Problem/Plan - 6:  Problem: Type 2 diabetes mellitus : poorly controlled DM with elevated A1C  Endocrine appreciated   continue long / short acting meds  monitor FS  discussed with pt re diet and weight loss.   nutrition consult and diabetic educator  consult done     Problem/Plan - 7:  ·  Problem: Essential hypertension with episode of hypotension   on home meds and stable   monitor closely  cardio f/u. and adjustment of meds as needed     Problem/Plan - 9:  ·  Problem: Hyperlipidemia  statin doubled given hyper tryglyceridemia  may need additional medication  diet  need close follow up as OP as discussed     Problem/Plan - 10:  Problem: Obesity (BMI 30.0-34.9). Plan; discussed with pt re diet and weight loss  needs sleep study as OP as seen snoring as discussed with pt     -GI/DVT Prophylaxis.    --------------------------------------------  Case discussed with pt, RN  Education given on findings and plan of care  ___________________________  H. REMIGIO De León.  Pager: [phone]
_________________________________________________________________________________________  ========>>  M E D I C A L   A T T E N D I N G    F O L L O W  U P  N O T E  <<=========  -----------------------------------------------------------------------------------------------------    - Patient seen and examined by me earlier today.   - In summary,  ELIZABETH LIPSCOMB is a 53y year old man who originally presented with lower abd pain   - Patient today overall doing ok, comfortable, eating OK. overall feels better, wants to go home      no CP, no palpitation no SOB, no GI /  c/o       doing ok on heparin drip for now    ==================>> REVIEW OF SYSTEM <<=================    GEN: no fever, no chills, no pain  RESP: no SOB, no cough, no sputum  CVS: no chest pain, no palpitations, no edema  GI: no abdominal pain, no nausea, no constipation, no diarrhea  : urinating well, no blood  Neuro: no headache, no dizziness  Derm : no itching, no rash    ==================>> PHYSICAL EXAM <<=================    GEN: A&O X 3 , NAD , comfortable in bed post breakfast   HEENT: NCAT, PERRL, MMM, hearing intact  Neck: supple , no JVD  CVS: S1S2 , regular , No M/R/G appreciated  PULM: CTA B/L,  no W/R/R appreciated  ABD.: soft. non tender, non distended,  bowel sounds present, obese  Extrem: intact pulses , no edema   PSYCH : normal mood,  not anxious       ==================>> MEDICATIONS <<====================    amLODIPine   Tablet 5 milliGRAM(s) Oral daily  aspirin enteric coated 81 milliGRAM(s) Oral daily  dextrose 5%. 1000 milliLiter(s) IV Continuous <Continuous>  dextrose 50% Injectable 12.5 Gram(s) IV Push once  dextrose 50% Injectable 25 Gram(s) IV Push once  dextrose 50% Injectable 25 Gram(s) IV Push once  heparin  Infusion.  Unit(s)/Hr IV Continuous <Continuous>  insulin glargine Injectable (LANTUS) 27 Unit(s) SubCutaneous at bedtime  insulin lispro (HumaLOG) corrective regimen sliding scale   SubCutaneous at bedtime  insulin lispro (HumaLOG) corrective regimen sliding scale   SubCutaneous three times a day before meals  insulin lispro Injectable (HumaLOG) 7 Unit(s) SubCutaneous before dinner  insulin lispro Injectable (HumaLOG) 9 Unit(s) SubCutaneous before breakfast  insulin lispro Injectable (HumaLOG) 9 Unit(s) SubCutaneous before lunch  losartan 25 milliGRAM(s) Oral daily  multivitamin 1 Tablet(s) Oral daily  simvastatin 20 milliGRAM(s) Oral at bedtime  sodium chloride 0.9%. 1000 milliLiter(s) IV Continuous <Continuous>  warfarin 6 milliGRAM(s) Oral once    MEDICATIONS  (PRN):  dextrose 40% Gel 15 Gram(s) Oral once PRN Blood Glucose LESS THAN 70 milliGRAM(s)/deciliter  glucagon  Injectable 1 milliGRAM(s) IntraMuscular once PRN Glucose LESS THAN 70 milligrams/deciliter  heparin  Injectable 7500 Unit(s) IV Push every 6 hours PRN For aPTT less than 40  heparin  Injectable 3500 Unit(s) IV Push every 6 hours PRN For aPTT between 40 - 57    ==================>> VITAL SIGNS <<==================    Vital Signs Last 24 Hrs  T(C): 36.7 (06-24-19 @ 13:29)  T(F): 98 (06-24-19 @ 13:29), Max: 98.9 (06-23-19 @ 20:56)  HR: 88 (06-24-19 @ 13:29) (86 - 92)  BP: 110/76 (06-24-19 @ 13:29)  RR: 18 (06-24-19 @ 13:29) (18 - 18)  SpO2: 97% (06-24-19 @ 13:29) (97% - 99%)      POCT Blood Glucose.: 168 mg/dL (24 Jun 2019 16:57)  POCT Blood Glucose.: 189 mg/dL (24 Jun 2019 12:40)  POCT Blood Glucose.: 157 mg/dL (24 Jun 2019 08:04)  POCT Blood Glucose.: 123 mg/dL (23 Jun 2019 21:32)     ==================>> LAB AND IMAGING <<==================                        15.1   8.24  )-----------( 248      ( 24 Jun 2019 09:35 )             46.3        135  |  103  |  20  ----------------------------<  168<H>  4.3   |  19<L>  |  1.01    Ca    9.2      24 Jun 2019 07:28      WBC count:   8.24 <<== ,  7.9 <<== ,  9.55 <<== ,  8.23 <<== ,  10.97 <<== ,  13.0 <<==   Hemoglobin:   15.1 <<==,  14.8 <<==,  15.4 <<==,  14.3 <<==,  15.0 <<==,  15.9 <<==  platelets:  248 <==, 229 <==, 252 <==, 221 <==, 232 <==, 243 <==, 264 <==    Creatinine:  1.01  <<==, 1.20  <<==, 1.63  <<==, 1.21  <<==, 2.43  <<==, 1.66  <<==  Sodium:   135  <==, 138  <==, 139  <==, 145  <==, 135  <==, 134  <==       AST:          20 <== , 16 <== , 23 <==      ALT:        20  <== , 20  <== , 25  <==      AP:        92  <=, 80  <=, 108  <=     Bili:        0.3  <=, 0.4  <=, 0.5  <=    < from: US Renal (06.24.19 @ 14:35) >  IMPRESSION:   Bilateral subcentimeter nonobstructive renal calculi.  No evidence of postrenal obstruction.  < end of copied text >    ___________________________________________________________________________________  ===============>>  A S S E S S M E N T   A N D   P L A N <<===============  ------------------------------------------------------------------------------------------    Problem/Plan - 1:  ·  Problem: Abnormal EKG, elevated troponin on admission and perhaps new diagnosis of systolic heart failure mitral valve abnormalities and small LV thrombus  cardiology follow up appreciated and discussd with attending   pt on heparan drip for now   will need coumadin bridge  will get cardiac cath tomorrow   continue aspirin, statin>> statin doubled   tele monitoring  supportive care    Problem/Plan - 2:  ·  Problem: MARTIN resolved   likely due to post obstructive uropathy and possible mild ATN due to hypotension  monitor labs   renal sono as above and discussed in detail with pt     Problem/Plan - 3:  ·  Problem: Other hydronephrosis resolved per sono   monitor renal function as back to normal range   encourage PO hydration.     Problem/Plan - 4:  ·  Problem: Urethral stone and additional nephrolithiasis   should follow up with urology as OP  discussed with pt re increased Po hydration     Problem/Plan - 5:  Problem: Type 2 diabetes mellitus      poorly controlled DM with elevated A1C  Endocrine appreciated   continue long / short acting meds  monitor FS  discussed with pt re diet and weight loss.   nutrition consult and diabetic educator  consult done     Problem/Plan - 6:  ·  Problem: Essential hypertension with episode of hypotension   on home meds and stable   monitor closely  cardio f/u. and adjustment of meds as needed     Problem/Plan - 7:  ·  Problem: Hyperlipidemia  statin doubled given hyper tryglyceridemia  may need additional medication  diet  need close follow up as OP as discussed     Problem/Plan - 8:  Problem: Obesity (BMI 30.0-34.9). Plan; discussed with pt re diet and weight loss  needs sleep study as OP as seen snoring as discussed with pt     -GI/DVT Prophylaxis.    --------------------------------------------  Case discussed with pt, NP, cardio   Education given on findings and plan of care  ___________________________  H. REMIGIO De León.  Pager: [phone]
_________________________________________________________________________________________  ========>>  M E D I C A L   A T T E N D I N G    F O L L O W  U P  N O T E  <<=========  -----------------------------------------------------------------------------------------------------    - Patient seen and examined by me earlier today.   - In summary,  ELIZABETH LIPSCOMB is a 53y year old man who originally presented with lower abd pain   - Patient today overall doing ok, comfortable, eating OK. overall feels better, wants to go home      no CP, no palpitation no SOB, no GI /  c/o     noted events overnight, with Echo results being discussed and acted upon as noted by NP       doing ok on heparin drip for no    urinating well post norris  no blood in urine     ==================>> REVIEW OF SYSTEM <<=================    GEN: no fever, no chills, no pain  RESP: no SOB, no cough, no sputum  CVS: no chest pain, no palpitations, no edema  GI: no abdominal pain, no nausea, no constipation, no diarrhea  : urinating well, no blood  Neuro: no headache, no dizziness  Derm : no itching, no rash    ==================>> PHYSICAL EXAM <<=================    GEN: A&O X 3 , NAD , comfortable in bed post breakfast   HEENT: NCAT, PERRL, MMM, hearing intact  Neck: supple , no JVD  CVS: S1S2 , regular , No M/R/G appreciated  PULM: CTA B/L,  no W/R/R appreciated  ABD.: soft. non tender, non distended,  bowel sounds present, obese  Extrem: intact pulses , no edema   PSYCH : normal mood,  not anxious       ==================>> MEDICATIONS <<====================    amLODIPine   Tablet 5 milliGRAM(s) Oral daily  aspirin enteric coated 81 milliGRAM(s) Oral daily  dextrose 5%. 1000 milliLiter(s) IV Continuous <Continuous>  dextrose 50% Injectable 12.5 Gram(s) IV Push once  dextrose 50% Injectable 25 Gram(s) IV Push once  dextrose 50% Injectable 25 Gram(s) IV Push once  heparin  Infusion.  Unit(s)/Hr IV Continuous <Continuous>  insulin glargine Injectable (LANTUS) 25 Unit(s) SubCutaneous at bedtime  insulin lispro (HumaLOG) corrective regimen sliding scale   SubCutaneous at bedtime  insulin lispro (HumaLOG) corrective regimen sliding scale   SubCutaneous three times a day before meals  insulin lispro Injectable (HumaLOG) 7 Unit(s) SubCutaneous three times a day before meals  losartan 25 milliGRAM(s) Oral daily  multivitamin 1 Tablet(s) Oral daily  simvastatin 20 milliGRAM(s) Oral at bedtime  sodium chloride 0.9%. 1000 milliLiter(s) IV Continuous <Continuous>    MEDICATIONS  (PRN):  dextrose 40% Gel 15 Gram(s) Oral once PRN Blood Glucose LESS THAN 70 milliGRAM(s)/deciliter  glucagon  Injectable 1 milliGRAM(s) IntraMuscular once PRN Glucose LESS THAN 70 milligrams/deciliter  heparin  Injectable 7500 Unit(s) IV Push every 6 hours PRN For aPTT less than 40  heparin  Injectable 3500 Unit(s) IV Push every 6 hours PRN For aPTT between 40 - 57    ==================>> VITAL SIGNS <<==================    Vital Signs Last 24 Hrs  T(C): 36.6 (06-23-19 @ 04:20)  T(F): 97.9 (06-23-19 @ 04:20), Max: 97.9 (06-23-19 @ 04:20)  HR: 84 (06-23-19 @ 04:20) (84 - 96)  BP: 131/85 (06-23-19 @ 04:20)  RR: 18 (06-23-19 @ 04:20) (18 - 18)  SpO2: 97% (06-23-19 @ 04:20) (97% - 98%)      POCT Blood Glucose.: 169 mg/dL (23 Jun 2019 08:03)  POCT Blood Glucose.: 139 mg/dL (22 Jun 2019 21:34)  POCT Blood Glucose.: 233 mg/dL (22 Jun 2019 16:49)  POCT Blood Glucose.: 224 mg/dL (22 Jun 2019 12:02)     ==================>> LAB AND IMAGING <<==================                        14.8   7.9   )-----------( 229      ( 23 Jun 2019 10:30 )             42.1        138  |  106  |  32<H>  ----------------------------<  185<H>  4.2   |  22  |  1.20    Ca    8.5      22 Jun 2019 05:34    TPro  6.9  /  Alb  3.2<L>  /  TBili  0.3  /  DBili  x   /  AST  20  /  ALT  20  /  AlkPhos  92  06-22    WBC count:   7.9 <<== ,  9.55 <<== ,  8.23 <<== ,  10.97 <<== ,  13.0 <<== ,  14.3 <<==   Hemoglobin:   14.8 <<==,  15.4 <<==,  14.3 <<==,  15.0 <<==,  15.9 <<==,  16.5 <<==  platelets:  229 <==, 252 <==, 221 <==, 232 <==, 243 <==, 264 <==    Creatinine:  1.20  <<==, 1.63  <<==, 1.21  <<==, 2.43  <<==, 1.66  <<==  Sodium:   138  <==, 139  <==, 145  <==, 135  <==, 134  <==       AST:          20 <== , 16 <== , 23 <==      ALT:        20  <== , 20  <== , 25  <==      AP:        92  <=, 80  <=, 108  <=     Bili:        0.3  <=, 0.4  <=, 0.5  <=     TSH:      1.33   (06-21-19)           Lipid profile:  (06-21-19)     Total: 145     LDL  : Unable to calculate Test Repeated  LDL Cholesterol (mg/dL) --- Interpretive Comment (for adults 18 and over)  Optimal LDL Level may vary based on clinical situation  Below 70                   Ideal for people at very high risk of heart  disease  Below 100                  Ideal for people at risk of heart disease  100 - 129                    [place]  130 - 159                    Borderline high  160 - 189                    High  190 and Above           Very high     HDL  :23     TG   :430     HgA1C: 11.5  (06-20-19)            < from: Transthoracic Echocardiogram (06.21.19 @ 14:56) >  Conclusions:  1. Poorly visualized mitral valve but there appears to be  redundant chordae and/or abnormal subvalvular movement +/-  echodensity. Suggest KELSEY to further characterize if  clinically indicated.  No mitral valve regurgitation seen.  2. Moderate segmental left ventricular systolic  dysfunction. Hypokinesis of the periapical and apical  walls.  Left ventricular thrombus seen measuring 0.7 x 0.9 cm at the apex.  3. Mild diastolic dysfunction (Stage I).  4. The right ventricle is not well visualized; grossly  normal right ventricular systolic function.  Primary team notified of critical findings.  < end of copied text >    _______________________  C U L T U R E S :    Culture - Urine (collected 20 Jun 2019 10:13)  Source: .Urine  Final Report (21 Jun 2019 02:34):    No growth  ___________________________________________________________________________________  ===============>>  A S S E S S M E N T   A N D   P L A N <<===============  ------------------------------------------------------------------------------------------    Problem/Plan - 1:  ·  Problem: Abnormal EKG, elevated troponin on admission and perhaps new diagnosis of systolic heart failure mitral valve abnormalities and smal LV thrombus  need further cardiology evaluation and follow up ( called)  pt on heparan drip for now   echo as above  continue aspirin, statin>> statin doubled   ? not on Beta blocker  tele monitoring  supportive care  further mgmt per cardio.     Problem/Plan - 2:  ·  Problem: MARTIN   likely due to post obstructive uropathy and possible mild ATN due to hypotension  monitor labs   renal sono , pending  trial of void being done today   doing well post TOV    Problem/Plan - 3:  ·  Problem: Other hydronephrosis.    monitor renal function as back to normal range   encourage PO hydration.   sono as above , pending     Problem/Plan - 4:  ·  Problem: Urethral stone.    as above : per CT scan likely due to passed stone ( stone seen in bladder).   should follow up with urology as OP    Problem/Plan - 5:  Problem: Type 2 diabetes mellitus      poorly controlled DM with elevated A1C  Endocrine appreciated   continue long / short acting meds  monitor FS  discussed with pt re diet and weight loss.   nutrition consult and diabetic educator  consult done     Problem/Plan - 6:  ·  Problem: Essential hypertension with episode of hypotension   on home meds and stable   monitor closely  cardio f/u. and adjustment of meds as needed     Problem/Plan - 7:  ·  Problem: Hyperlipidemia  statin doubled given hyper tryglyceridemia  may need additional medication  diet  need close follow up as OP as discussed     Problem/Plan - 8:  Problem: Obesity (BMI 30.0-34.9). Plan; discussed with pt re diet and weight loss  needs sleep study as OP as seen snoring as discussed with pt     -GI/DVT Prophylaxis.    --------------------------------------------  Case discussed with pt, NP  Education given on findings and plan of care  ___________________________  HMelida De León D.O.  Pager: [phone]
_________________________________________________________________________________________  ========>>  M E D I C A L   A T T E N D I N G    F O L L O W  U P  N O T E  <<=========  -----------------------------------------------------------------------------------------------------    - Patient seen and examined by me earlier today.   - In summary,  ELIZABETH LIPSCOMB is a 53y year old man who originally presented with lower abd pain   - Patient today overall doing ok, comfortable, eating OK. overall feels ok       no CP, no palpitation no SOB, no GI /  c/o     ==================>> REVIEW OF SYSTEM <<=================    GEN: no fever, no chills, no pain  RESP: no SOB, no cough, no sputum  CVS: no chest pain, no palpitations, no edema  GI: no abdominal pain, no nausea, no constipation, no diarrhea  : urinating well, no blood  Neuro: no headache, no dizziness  Derm : no itching, no rash    ==================>> PHYSICAL EXAM <<=================    GEN: A&O X 3 , NAD , comfortable in chair   HEENT: NCAT, PERRL, MMM, hearing intact  Neck: supple , no JVD  CVS: S1S2 , regular , No M/R/G appreciated  PULM: CTA B/L,  no W/R/R appreciated  ABD.: soft. non tender, non distended,  bowel sounds present, obese  Extrem: intact pulses , no edema   PSYCH : normal mood,  not anxious       ==================>> MEDICATIONS <<====================    aspirin enteric coated 81 milliGRAM(s) Oral daily  dextrose 5%. 1000 milliLiter(s) IV Continuous <Continuous>  dextrose 50% Injectable 12.5 Gram(s) IV Push once  dextrose 50% Injectable 25 Gram(s) IV Push once  dextrose 50% Injectable 25 Gram(s) IV Push once  heparin  Infusion. 1200 Unit(s)/Hr IV Continuous <Continuous>  insulin glargine Injectable (LANTUS) 29 Unit(s) SubCutaneous at bedtime  insulin lispro (HumaLOG) corrective regimen sliding scale   SubCutaneous at bedtime  insulin lispro (HumaLOG) corrective regimen sliding scale   SubCutaneous three times a day before meals  insulin lispro Injectable (HumaLOG) 9 Unit(s) SubCutaneous before dinner  insulin lispro Injectable (HumaLOG) 9 Unit(s) SubCutaneous before breakfast  insulin lispro Injectable (HumaLOG) 9 Unit(s) SubCutaneous before lunch  losartan 50 milliGRAM(s) Oral daily  metoprolol succinate ER 12.5 milliGRAM(s) Oral every 12 hours  multivitamin 1 Tablet(s) Oral daily  simvastatin 20 milliGRAM(s) Oral at bedtime  sodium chloride 0.9%. 1000 milliLiter(s) IV Continuous <Continuous>  warfarin 6 milliGRAM(s) Oral once    MEDICATIONS  (PRN):  dextrose 40% Gel 15 Gram(s) Oral once PRN Blood Glucose LESS THAN 70 milliGRAM(s)/deciliter  glucagon  Injectable 1 milliGRAM(s) IntraMuscular once PRN Glucose LESS THAN 70 milligrams/deciliter  heparin  Injectable 7500 Unit(s) IV Push every 6 hours PRN For aPTT less than 40  heparin  Injectable 3500 Unit(s) IV Push every 6 hours PRN For aPTT between 40 - 57    ==================>> VITAL SIGNS <<==================    Vital Signs Last 24 Hrs  T(C): 36.4 (06-25-19 @ 13:44)  T(F): 97.5 (06-25-19 @ 13:44), Max: 98.2 (06-24-19 @ 20:48)  HR: 91 (06-25-19 @ 14:48) (84 - 91)  BP: 102/72 (06-25-19 @ 14:48)  RR: 18 (06-25-19 @ 13:44) (16 - 18)  SpO2: 96% (06-25-19 @ 13:44) (96% - 99%)      POCT Blood Glucose.: 209 mg/dL (25 Jun 2019 16:42)  POCT Blood Glucose.: 290 mg/dL (25 Jun 2019 12:25)  POCT Blood Glucose.: 225 mg/dL (25 Jun 2019 07:57)  POCT Blood Glucose.: 210 mg/dL (24 Jun 2019 21:44)     ==================>> LAB AND IMAGING <<==================                        14.0   7.52  )-----------( 251      ( 25 Jun 2019 10:08 )             43.3        134<L>  |  100  |  24<H>  ----------------------------<  200<H>  4.3   |  22  |  1.09    Ca    9.1      25 Jun 2019 07:01    WBC count:   7.52 <<== ,  8.24 <<== ,  7.9 <<== ,  9.55 <<== ,  8.23 <<== ,  10.97 <<==   Hemoglobin:   14.0 <<==,  15.1 <<==,  14.8 <<==,  15.4 <<==,  14.3 <<==,  15.0 <<==  platelets:  251 <==, 248 <==, 229 <==, 252 <==, 221 <==, 232 <==, 243 <==    Creatinine:  1.09  <<==, 1.01  <<==, 1.20  <<==, 1.63  <<==, 1.21  <<==, 2.43  <<==  Sodium:   134  <==, 135  <==, 138  <==, 139  <==, 145  <==, 135  <==, 134  <==       AST:          20 <== , 16 <==      ALT:        20  <== , 20  <==      AP:        92  <=, 80  <=     Bili:        0.3  <=, 0.4  <=    I N R :  0.99  <<==, 1.04  <<==    cardiac cath official report pending ( prelim: no intervention)     ___________________________________________________________________________________  ===============>>  A S S E S S M E N T   A N D   P L A N <<===============  ------------------------------------------------------------------------------------------    Problem/Plan - 1:  ·  Problem: Abnormal EKG, elevated troponin on admission and perhaps new diagnosis of systolic heart failure mitral valve abnormalities and small LV thrombus  cardiology follow up appreciated   pt on heparan drip for now  >> coumadin bridge  post cardiac cath today   continue aspirin, statin>> statin doubled    tele monitoring  supportive care    Problem/Plan - 2:  ·  Problem: MARTIN resolved   likely due to post obstructive uropathy and possible mild ATN due to hypotension  monitor labs   renal sono noted and discussed in detail with pt     Problem/Plan - 3:  ·  Problem: Other hydronephrosis resolved per sono   monitor renal function as back to normal range   encourage PO hydration.     Problem/Plan - 4:  ·  Problem: Urethral stone and additional nephrolithiasis   should follow up with urology as OP  discussed with pt re increased Po hydration     Problem/Plan - 5:  Problem: Type 2 diabetes mellitus      poorly controlled DM with elevated A1C  Endocrine appreciated   continue long / short acting meds  monitor FS  discussed with pt re diet and weight loss.   nutrition consult and diabetic educator  consult done     Problem/Plan - 6:  ·  Problem: Essential hypertension with episode of hypotension   on home meds and stable   monitor closely  cardio f/u. and adjustment of meds as needed     Problem/Plan - 7:  ·  Problem: Hyperlipidemia  statin doubled given hyper tryglyceridemia  may need additional medication  diet  need close follow up as OP as discussed     Problem/Plan - 8:  Problem: Obesity (BMI 30.0-34.9). Plan; discussed with pt re diet and weight loss  needs sleep study as OP as seen snoring as discussed with pt     -GI/DVT Prophylaxis.  Dispo plan pending therapeutic INR   --------------------------------------------  Case discussed with pt,   I had  LONG discussion wiht pt regarding coumadin dosing, monitoring, diet, side effects... all questions answered , pt already met with nutritionist   Education given on findings and plan of care  ___________________________  H. REMIGIO De León.  Pager: [phone]
_________________________________________________________________________________________  ========>>  M E D I C A L   A T T E N D I N G    F O L L O W  U P  N O T E  <<=========  -----------------------------------------------------------------------------------------------------    - Patient seen and examined by me earlier today.   - In summary,  ELIZABETH LIPSCOMB is a 53y year old man who originally presented with lower abd pain   - Patient today overall doing ok, comfortable, eating OK. overall feels ok       no CP, no palpitation no SOB, no GI /  c/o     ==================>> REVIEW OF SYSTEM <<=================    GEN: no fever, no chills, no pain  RESP: no SOB, no cough, no sputum  CVS: no chest pain, no palpitations, no edema  GI: no abdominal pain, no nausea, no constipation, no diarrhea  : urinating well, no blood  Neuro: no headache, no dizziness  Derm : no itching, no rash    ==================>> PHYSICAL EXAM <<=================    GEN: A&O X 3 , NAD , comfortable in chair   HEENT: NCAT, PERRL, MMM, hearing intact  Neck: supple , no JVD  CVS: S1S2 , regular , No M/R/G appreciated  PULM: CTA B/L,  no W/R/R appreciated  ABD.: soft. non tender, non distended,  bowel sounds present, obese  Extrem: intact pulses , no edema   PSYCH : normal mood,  not anxious      ==================>> MEDICATIONS <<====================    aspirin enteric coated 81 milliGRAM(s) Oral daily  dextrose 5%. 1000 milliLiter(s) IV Continuous <Continuous>  dextrose 50% Injectable 12.5 Gram(s) IV Push once  dextrose 50% Injectable 25 Gram(s) IV Push once  dextrose 50% Injectable 25 Gram(s) IV Push once  heparin  Infusion. 1100 Unit(s)/Hr IV Continuous <Continuous>  insulin glargine Injectable (LANTUS) 30 Unit(s) SubCutaneous at bedtime  insulin lispro (HumaLOG) corrective regimen sliding scale   SubCutaneous at bedtime  insulin lispro (HumaLOG) corrective regimen sliding scale   SubCutaneous three times a day before meals  insulin lispro Injectable (HumaLOG) 11 Unit(s) SubCutaneous before breakfast  insulin lispro Injectable (HumaLOG) 11 Unit(s) SubCutaneous before lunch  insulin lispro Injectable (HumaLOG) 11 Unit(s) SubCutaneous before dinner  losartan 50 milliGRAM(s) Oral daily  metoprolol succinate ER 12.5 milliGRAM(s) Oral every 12 hours  multivitamin 1 Tablet(s) Oral daily  simvastatin 20 milliGRAM(s) Oral at bedtime  sodium chloride 0.9%. 1000 milliLiter(s) IV Continuous <Continuous>  warfarin 10 milliGRAM(s) Oral once    MEDICATIONS  (PRN):  dextrose 40% Gel 15 Gram(s) Oral once PRN Blood Glucose LESS THAN 70 milliGRAM(s)/deciliter  glucagon  Injectable 1 milliGRAM(s) IntraMuscular once PRN Glucose LESS THAN 70 milligrams/deciliter  heparin  Injectable 7500 Unit(s) IV Push every 6 hours PRN For aPTT less than 40  heparin  Injectable 3500 Unit(s) IV Push every 6 hours PRN For aPTT between 40 - 57    ==================>> VITAL SIGNS <<==================    Vital Signs Last 24 Hrs  T(C): 36.6 (06-26-19 @ 11:05)  T(F): 97.8 (06-26-19 @ 11:05), Max: 98.2 (06-25-19 @ 20:28)  HR: 87 (06-26-19 @ 11:05) (77 - 88)  BP: 120/87 (06-26-19 @ 11:05)  RR: 17 (06-26-19 @ 11:05) (17 - 18)  SpO2: 100% (06-26-19 @ 11:05) (96% - 100%)      POCT Blood Glucose.: 115 mg/dL (26 Jun 2019 16:35)  POCT Blood Glucose.: 220 mg/dL (26 Jun 2019 11:43)  POCT Blood Glucose.: 203 mg/dL (26 Jun 2019 07:56)  POCT Blood Glucose.: 201 mg/dL (25 Jun 2019 21:22)     ==================>> LAB AND IMAGING <<==================                        14.5   8.32  )-----------( 251      ( 26 Jun 2019 10:17 )             45.6        135  |  102  |  22  ----------------------------<  215<H>  4.9   |  22  |  0.98    Ca    9.6      26 Jun 2019 08:15    WBC count:   8.32 <<== ,  9.3 <<== ,  7.52 <<== ,  8.24 <<== ,  7.9 <<== ,  9.55 <<==   Hemoglobin:   14.5 <<==,  14.4 <<==,  14.0 <<==,  15.1 <<==,  14.8 <<==,  15.4 <<==  platelets:  251 <==, 267 <==, 251 <==, 248 <==, 229 <==, 252 <==, 221 <==    Creatinine:  0.98  <<==, 1.09  <<==, 1.01  <<==, 1.20  <<==, 1.63  <<==, 1.21  <<==  Sodium:   135  <==, 134  <==, 135  <==, 138  <==, 139  <==, 145  <==, 135  <==    I N R :  1.05  <<==, 0.99  <<==, 1.04  <<==    < from: Cardiac Cath Lab - Adult (06.25.19 @ 09:48) >  CORONARY VESSELS: The coronary circulation is right dominant.  LM:  --  LM: Normal.  LAD:   --  Proximal LAD: There was a tubular 10 % stenosis at the site of a  prior stent, in-stent.  --  Distal LAD: There was a discrete 20 % stenosis at the site of a prior  stent, in-stent.  --  D1: Angiography showed minor luminal irregularities with no flow  limiting lesions.  CX:   --  Proximal circumflex: Angiography showed minor luminal  irregularities with no flow limiting lesions.  --  OM1: Angiography showed minor luminal irregularities with no flow  limiting lesions.  RCA:   --  Mid RCA: There was a 50 % stenosis.  COMPLICATIONS: There were no complications.  DIAGNOSTIC IMPRESSIONS: Patent prior stents. Mild-mod CAD.  DIAGNOSTIC RECOMMENDATIONS: Medical therapy.  < end of copied text >    ___________________________________________________________________________________  ===============>>  A S S E S S M E N T   A N D   P L A N <<===============  ------------------------------------------------------------------------------------------    Problem/Plan - 1:  ·  Problem: Abnormal EKG, elevated troponin on admission and perhaps new diagnosis of systolic heart failure mitral valve abnormalities and small LV thrombus  cardiology follow up appreciated   pt on heparan drip for now  >> coumadin bridge  post cardiac cath with no intervention for mild to moderate CAD   continue aspirin, statin   tele monitoring  supportive care    Problem/Plan - 2:  ·  Problem: MARTIN resolved   likely due to post obstructive uropathy and possible mild ATN due to hypotension  monitor labs   renal sono noted and discussed in detail with pt     Problem/Plan - 3:  ·  Problem: Other hydronephrosis resolved per sono   monitor renal function as back to normal range   encourage PO hydration.     Problem/Plan - 4:  ·  Problem: Urethral stone and additional nephrolithiasis   should follow up with urology as OP  discussed with pt re increased Po hydration     Problem/Plan - 5:  Problem: Type 2 diabetes mellitus      poorly controlled DM with elevated A1C  Endocrine appreciated   continue long / short acting meds  monitor FS  discussed with pt re diet and weight loss.   nutrition consult and diabetic educator  consult done     Problem/Plan - 6:  ·  Problem: Essential hypertension with episode of hypotension   on home meds and stable   monitor closely  cardio f/u. and adjustment of meds as needed     Problem/Plan - 7:  ·  Problem: Hyperlipidemia  statin doubled given hyper tryglyceridemia  may need additional medication  diet  need close follow up as OP as discussed     Problem/Plan - 8:  Problem: Obesity (BMI 30.0-34.9). Plan; discussed with pt re diet and weight loss  needs sleep study as OP as seen snoring as discussed with pt     -GI/DVT Prophylaxis.  Dispo plan pending therapeutic INR   --------------------------------------------  Case discussed with pt,   I had  LONG discussion wiht pt regarding coumadin dosing, monitoring, diet, side effects... all questions answered , pt already met with nutritionist   Education given on findings and plan of care  ___________________________  HMelida De León D.O.  Pager: [phone]
_________________________________________________________________________________________  ========>>  M E D I C A L   A T T E N D I N G    F O L L O W  U P  N O T E  <<=========  -----------------------------------------------------------------------------------------------------    - Patient seen and examined by me earlier today.   - In summary,  ELIZABETH LIPSCOMB is a 53y year old man who originally presented with lower abd pain   - Patient today overall doing ok, comfortable, eating OK. overall feels ok , ambulating     ==================>> REVIEW OF SYSTEM <<=================    GEN: no fever, no chills, no pain  RESP: no SOB, no cough, no sputum  CVS: no chest pain, no palpitations, no edema  GI: no abdominal pain, no nausea  : urinating well, no blood  Neuro: no headache, no dizziness  Derm : no itching, no rash    ==================>> PHYSICAL EXAM <<=================    GEN: A&O X 3 , NAD , comfortable in chair   HEENT: NCAT, PERRL, MMM, hearing intact  Neck: supple , no JVD  CVS: S1S2 , regular , No M/R/G appreciated  PULM: CTA B/L,  no W/R/R appreciated  ABD.: soft. non tender, non distended,  bowel sounds present, obese  Extrem: intact pulses , no edema   PSYCH : normal mood,  not anxious      ==================>> MEDICATIONS <<====================    aspirin enteric coated 81 milliGRAM(s) Oral daily  dextrose 5%. 1000 milliLiter(s) IV Continuous <Continuous>  dextrose 50% Injectable 12.5 Gram(s) IV Push once  dextrose 50% Injectable 25 Gram(s) IV Push once  dextrose 50% Injectable 25 Gram(s) IV Push once  heparin  Infusion. 1100 Unit(s)/Hr IV Continuous <Continuous>  insulin glargine Injectable (LANTUS) 30 Unit(s) SubCutaneous at bedtime  insulin lispro (HumaLOG) corrective regimen sliding scale   SubCutaneous at bedtime  insulin lispro (HumaLOG) corrective regimen sliding scale   SubCutaneous three times a day before meals  insulin lispro Injectable (HumaLOG) 11 Unit(s) SubCutaneous before breakfast  insulin lispro Injectable (HumaLOG) 11 Unit(s) SubCutaneous before lunch  insulin lispro Injectable (HumaLOG) 11 Unit(s) SubCutaneous before dinner  losartan 50 milliGRAM(s) Oral daily  metoprolol succinate ER 12.5 milliGRAM(s) Oral every 12 hours  multivitamin 1 Tablet(s) Oral daily  simvastatin 20 milliGRAM(s) Oral at bedtime  sodium chloride 0.9%. 1000 milliLiter(s) IV Continuous <Continuous>  warfarin 10 milliGRAM(s) Oral once    MEDICATIONS  (PRN):  dextrose 40% Gel 15 Gram(s) Oral once PRN Blood Glucose LESS THAN 70 milliGRAM(s)/deciliter  glucagon  Injectable 1 milliGRAM(s) IntraMuscular once PRN Glucose LESS THAN 70 milligrams/deciliter  heparin  Injectable 7500 Unit(s) IV Push every 6 hours PRN For aPTT less than 40  heparin  Injectable 3500 Unit(s) IV Push every 6 hours PRN For aPTT between 40 - 57    ==================>> VITAL SIGNS <<==================    Vital Signs Last 24 Hrs  T(C): 36.5 (06-27-19 @ 17:22)  T(F): 97.7 (06-27-19 @ 17:22), Max: 97.9 (06-27-19 @ 04:10)  HR: 97 (06-27-19 @ 17:22) (81 - 97)  BP: 151/92 (06-27-19 @ 17:22)  RR: 18 (06-27-19 @ 17:22) (18 - 18)  SpO2: 100% (06-27-19 @ 17:22) (95% - 100%)      POCT Blood Glucose.: 159 mg/dL (27 Jun 2019 16:33)  POCT Blood Glucose.: 192 mg/dL (27 Jun 2019 11:54)  POCT Blood Glucose.: 165 mg/dL (27 Jun 2019 07:54)  POCT Blood Glucose.: 175 mg/dL (26 Jun 2019 21:28)     ==================>> LAB AND IMAGING <<==================                        13.7   8.72  )-----------( 253      ( 27 Jun 2019 08:15 )             42.8          135  |  102  |  22  ----------------------------<  215<H>  4.9   |  22  |  0.98    Ca    9.6      26 Jun 2019 08:15    WBC count:   8.72 <<== ,  9.6 <<== ,  8.32 <<== ,  9.3 <<== ,  7.52 <<== ,  8.24 <<==   Hemoglobin:   13.7 <<==,  14.5 <<==,  14.5 <<==,  14.4 <<==,  14.0 <<==,  15.1 <<==  platelets:  253 <==, 254 <==, 251 <==, 267 <==, 251 <==, 248 <==, 229 <==    Creatinine:  0.98  <<==, 1.09  <<==, 1.01  <<==, 1.20  <<==, 1.63  <<==, 1.21  <<==  Sodium:   135  <==, 134  <==, 135  <==, 138  <==, 139  <==, 145  <==      I N R :  1.24  <<==, 1.05  <<==, 0.99  <<==, 1.04  <<==    < from: Cardiac Cath Lab - Adult (06.25.19 @ 09:48) >  CORONARY VESSELS: The coronary circulation is right dominant.  LM:  --  LM: Normal.  LAD:   --  Proximal LAD: There was a tubular 10 % stenosis at the site of a  prior stent, in-stent.  --  Distal LAD: There was a discrete 20 % stenosis at the site of a prior  stent, in-stent.  --  D1: Angiography showed minor luminal irregularities with no flow  limiting lesions.  CX:   --  Proximal circumflex: Angiography showed minor luminal  irregularities with no flow limiting lesions.  --  OM1: Angiography showed minor luminal irregularities with no flow  limiting lesions.  RCA:   --  Mid RCA: There was a 50 % stenosis.  COMPLICATIONS: There were no complications.  DIAGNOSTIC IMPRESSIONS: Patent prior stents. Mild-mod CAD.  DIAGNOSTIC RECOMMENDATIONS: Medical therapy.  < end of copied text >    ___________________________________________________________________________________  ===============>>  A S S E S S M E N T   A N D   P L A N <<===============  ------------------------------------------------------------------------------------------    Problem/Plan - 1:  ·  Problem: Abnormal EKG, elevated troponin on admission and perhaps new diagnosis of systolic heart failure mitral valve abnormalities and small LV thrombus  cardiology follow up appreciated   pt on heparan drip for now  >> coumadin bridge  post cardiac cath with no intervention for mild to moderate CAD   continue aspirin, statin   tele monitoring  supportive care    Problem/Plan - 2:  ·  Problem: MARTIN resolved   likely due to post obstructive uropathy and possible mild ATN due to hypotension  monitor labs   renal sono noted and discussed in detail with pt     Problem/Plan - 3:  ·  Problem: Other hydronephrosis resolved per sono   monitor renal function as back to normal range   encourage PO hydration.     Problem/Plan - 4:  ·  Problem: Urethral stone and additional nephrolithiasis   should follow up with urology as OP  discussed with pt re increased Po hydration     Problem/Plan - 5:  Problem: Type 2 diabetes mellitus      poorly controlled DM with elevated A1C  Endocrine appreciated   continue long / short acting meds  monitor FS  discussed with pt re diet and weight loss.   nutrition consult and diabetic educator  consult done     Problem/Plan - 6:  ·  Problem: Essential hypertension with episode of hypotension   on home meds and stable   monitor closely  cardio f/u. and adjustment of meds as needed     Problem/Plan - 7:  ·  Problem: Hyperlipidemia  statin doubled given hyper tryglyceridemia  may need additional medication  diet  need close follow up as OP as discussed     Problem/Plan - 8:  Problem: Obesity (BMI 30.0-34.9). Plan; discussed with pt re diet and weight loss  needs sleep study as OP as seen snoring as discussed with pt     -GI/DVT Prophylaxis.  Dispo plan pending therapeutic INR   --------------------------------------------  Case discussed with pt, and wife  I had  LONG discussion wiht pt regarding coumadin dosing, monitoring, diet, side effects... all questions answered , pt already met with nutritionist   Education given on findings and plan of care  ___________________________  H. REMIGIO De León.  Pager: [phone]
52 y/o M w/h/o uncontrolled T2DM  with variable adherence to meds (Basaglar 25 units q hs plus Janumet 50/1000 bid). Here with L lower back pain irradiating to L lower abdomen found to have kidney stone in bladder. Pt with FC to BSD. Tolerating POs with glycemic control above goal while on present insulin doses. No hypoglycemia. Will increase prandial insulin doses for now since creat is going up and FBG are improving while on present Lantus dose. Denies pain at time of visit. Also hypertriglyceridemia on statin. Creat up today.  Met with patient and reviewed the following:    -A1c LEVEL: Present and goal  -Blood glucose goals: 100s to 150s as out pt  -Glucose monitoring frequency: ac and hs  -Healthy eating and portion control  -Insulin(s) action, time of administration and side effects. Basal/bolus including importance of adherence  -Importance of follow up care  Spent over 20 minutes providing face to face education.
54 y/o M w/h/o uncontrolled T2DM  with variable adherence to meds (Basaglar 25 units q hs plus Janumet 50/1000 bid). Here with L lower back pain irradiating to L lower abdomen found to have kidney stone in bladder> pt passed stone and is now voiding without complaints. Tolerating POs with glycemic control above goal (>200s). No hypoglycemia. Creat stable. S/p cardiac cath yesterday no stenting required. Will increase basal/bolus doses to BG goal 100s to 180s.   Reinforced with pt the present need to continue insulin basal/bolus therapy. Pt verbalized understanding and agreed to follow this regimen at home.
54 y/o M w/h/o uncontrolled T2DM  with variable adherence to meds (Basaglar 25 units q hs plus Janumet 50/1000 bid). Here with L lower back pain irradiating to L lower abdomen found to have kidney stone in bladder> pt passed stone and is now voiding without complaints. Tolerating POs with glycemic control improved while on present insulin doses. No hypoglycemia. Creat is going down. Reinforced with pt the present need to continue insulin basal/bolus therapy. Pt verbalized understanding and agreed to follow this regimen at home.
54 y/o M w/h/o uncontrolled T2DM  with variable adherence to meds (Basaglar 25 units q hs plus Janumet 50/1000 bid). Here with L lower back pain irradiating to L lower abdomen found to have kidney stone in bladder> pt passed stone and is now voiding without complaints. Tolerating POs with glycemic control improved yesterday while on present insulin doses but noted hyperglycemia since HS last night. No hypoglycemia. Creat stable. S/p cardiac cath today. Will increase basal/bolus doses to BG goal 100s to 180s.   Reinforced with pt the present need to continue insulin basal/bolus therapy. Pt verbalized understanding and agreed to follow this regimen at home.
_________________________________________________________________________________________  ========>>  M E D I C A L   A T T E N D I N G    F O L L O W  U P  N O T E  <<=========  -----------------------------------------------------------------------------------------------------    - Patient seen and examined by me earlier today.   - In summary,  ELIZABETH LIPSCOMB is a 53y year old man who originally presented with lower abd pain   - Patient today overall doing ok, comfortable, eating OK. overall feels ok , ambulating     ==================>> REVIEW OF SYSTEM <<=================    GEN: no fever, no chills, no pain  RESP: no SOB, no cough, no sputum  CVS: no chest pain, no palpitations, no edema  GI: no abdominal pain, no nausea  : urinating well, no blood  Neuro: no headache, no dizziness  Derm : no itching, no rash    ==================>> PHYSICAL EXAM <<=================    GEN: A&O X 3 , NAD , comfortable in chair   HEENT: NCAT, PERRL, MMM, hearing intact  Neck: supple , no JVD  CVS: S1S2 , regular , No M/R/G appreciated  PULM: CTA B/L,  no W/R/R appreciated  ABD.: soft. non tender, non distended,  bowel sounds present, obese  Extrem: intact pulses , no edema   PSYCH : normal mood,  not anxious        ==================>> MEDICATIONS <<====================    aspirin enteric coated 81 milliGRAM(s) Oral daily  dextrose 5%. 1000 milliLiter(s) IV Continuous <Continuous>  dextrose 50% Injectable 12.5 Gram(s) IV Push once  dextrose 50% Injectable 25 Gram(s) IV Push once  dextrose 50% Injectable 25 Gram(s) IV Push once  heparin  Infusion. 1100 Unit(s)/Hr IV Continuous <Continuous>  insulin glargine Injectable (LANTUS) 30 Unit(s) SubCutaneous at bedtime  insulin lispro (HumaLOG) corrective regimen sliding scale   SubCutaneous at bedtime  insulin lispro (HumaLOG) corrective regimen sliding scale   SubCutaneous three times a day before meals  insulin lispro Injectable (HumaLOG) 11 Unit(s) SubCutaneous before breakfast  insulin lispro Injectable (HumaLOG) 11 Unit(s) SubCutaneous before lunch  insulin lispro Injectable (HumaLOG) 11 Unit(s) SubCutaneous before dinner  losartan 50 milliGRAM(s) Oral daily  metoprolol succinate ER 12.5 milliGRAM(s) Oral every 12 hours  multivitamin 1 Tablet(s) Oral daily  simvastatin 20 milliGRAM(s) Oral at bedtime  sodium chloride 0.9%. 1000 milliLiter(s) IV Continuous <Continuous>  warfarin 7.5 milliGRAM(s) Oral once    MEDICATIONS  (PRN):  dextrose 40% Gel 15 Gram(s) Oral once PRN Blood Glucose LESS THAN 70 milliGRAM(s)/deciliter  glucagon  Injectable 1 milliGRAM(s) IntraMuscular once PRN Glucose LESS THAN 70 milligrams/deciliter  heparin  Injectable 7500 Unit(s) IV Push every 6 hours PRN For aPTT less than 40  heparin  Injectable 3500 Unit(s) IV Push every 6 hours PRN For aPTT between 40 - 57    ==================>> VITAL SIGNS <<==================    Vital Signs Last 24 Hrs  T(C): 36.7 (06-28-19 @ 11:03)  T(F): 98 (06-28-19 @ 11:03), Max: 98 (06-28-19 @ 11:03)  HR: 79 (06-28-19 @ 17:01) (71 - 88)  BP: 121/82 (06-28-19 @ 17:01)  RR: 16 (06-28-19 @ 17:01) (16 - 18)  SpO2: 97% (06-28-19 @ 17:01) (96% - 100%)      POCT Blood Glucose.: 221 mg/dL (28 Jun 2019 16:40)  POCT Blood Glucose.: 230 mg/dL (28 Jun 2019 11:52)  POCT Blood Glucose.: 169 mg/dL (28 Jun 2019 07:49)  POCT Blood Glucose.: 186 mg/dL (27 Jun 2019 21:36)     ==================>> LAB AND IMAGING <<==================                        14.0   8.49  )-----------( 258      ( 28 Jun 2019 08:36 )             42.5        WBC count:   8.49 <<== ,  8.72 <<== ,  9.6 <<== ,  8.32 <<== ,  9.3 <<== ,  7.52 <<==   Hemoglobin:   14.0 <<==,  13.7 <<==,  14.5 <<==,  14.5 <<==,  14.4 <<==,  14.0 <<==  platelets:  258 <==, 253 <==, 254 <==, 251 <==, 267 <==, 251 <==, 248 <==    Creatinine:  0.98  <<==, 1.09  <<==, 1.01  <<==  Sodium:   135  <==, 134  <==, 135  <==    I N R :  1.66  <<==, 1.24  <<==, 1.05  <<==, 0.99  <<==, 1.04  <<==    < from: Cardiac Cath Lab - Adult (06.25.19 @ 09:48) >  CORONARY VESSELS: The coronary circulation is right dominant.  LM:  --  LM: Normal.  LAD:   --  Proximal LAD: There was a tubular 10 % stenosis at the site of a  prior stent, in-stent.  --  Distal LAD: There was a discrete 20 % stenosis at the site of a prior  stent, in-stent.  --  D1: Angiography showed minor luminal irregularities with no flow  limiting lesions.  CX:   --  Proximal circumflex: Angiography showed minor luminal  irregularities with no flow limiting lesions.  --  OM1: Angiography showed minor luminal irregularities with no flow  limiting lesions.  RCA:   --  Mid RCA: There was a 50 % stenosis.  COMPLICATIONS: There were no complications.  DIAGNOSTIC IMPRESSIONS: Patent prior stents. Mild-mod CAD.  DIAGNOSTIC RECOMMENDATIONS: Medical therapy.  < end of copied text >    ___________________________________________________________________________________  ===============>>  A S S E S S M E N T   A N D   P L A N <<===============  ------------------------------------------------------------------------------------------    Problem/Plan - 1:  ·  Problem: Abnormal EKG, elevated troponin on admission and perhaps new diagnosis of systolic heart failure mitral valve abnormalities and small LV thrombus  cardiology follow up appreciated   pt on heparan drip for now  >> coumadin bridge  post cardiac cath with no intervention for mild to moderate CAD   continue aspirin, statin   tele monitoring  supportive care    Problem/Plan - 2:  ·  Problem: MARTIN resolved   likely due to post obstructive uropathy and possible mild ATN due to hypotension  monitor labs   renal sono noted and discussed in detail with pt     Problem/Plan - 3:  ·  Problem: Other hydronephrosis resolved per sono   monitor renal function as back to normal range   encourage PO hydration.     Problem/Plan - 4:  ·  Problem: Urethral stone and additional nephrolithiasis   should follow up with urology as OP  discussed with pt re increased Po hydration     Problem/Plan - 5:  Problem: Type 2 diabetes mellitus      poorly controlled DM with elevated A1C  Endocrine appreciated   continue long / short acting meds  monitor FS  discussed with pt re diet and weight loss. increase activity d/w pt   nutrition consult and diabetic educator  consult done     Problem/Plan - 6:  ·  Problem: Essential hypertension with episode of hypotension   on home meds and stable   OP f/u    Problem/Plan - 7:  ·  Problem: Hyperlipidemia  statin doubled given hyper tryglyceridemia  may need additional medication  diet  need close follow up as OP as discussed     Problem/Plan - 8:  Problem: Obesity (BMI 30.0-34.9). Plan; discussed with pt re diet and weight loss  needs sleep study as OP as seen snoring as discussed with pt     -GI/DVT Prophylaxis.  Dispo plan pending therapeutic INR   --------------------------------------------  Case discussed with pt,   Education given on findings and plan of care  ___________________________  BLAYNE De León D.O.  Pager: [phone]
52 y/o M w/h/o uncontrolled T2DM  with variable adherence to meds (Basaglar 25 units q hs plus Janumet 50/1000 bid). Here with L lower back pain irradiating to L lower abdomen found to have kidney stone in bladder. Pt with FC to BSD. Tolerating POs with glycemic control above goal while on present insulin doses. No hypoglycemia.

## 2019-06-29 NOTE — DISCHARGE NOTE PROVIDER - NSDCCPCAREPLAN_GEN_ALL_CORE_FT
PRINCIPAL DISCHARGE DIAGNOSIS  Diagnosis: Left ventricular thrombus  Assessment and Plan of Treatment:         SECONDARY DISCHARGE DIAGNOSES  Diagnosis: Abdominal pain  Assessment and Plan of Treatment:

## 2019-06-29 NOTE — DISCHARGE NOTE PROVIDER - CARE PROVIDER_API CALL
Dr. NAIDA Teague,   Phone: (   )    -  Fax: (   )    -  Follow Up Time:     [facility],   [address] [place] [address]  Phone: [phone]  Fax: (   )    -  Follow Up Time:

## 2019-06-29 NOTE — DISCHARGE NOTE PROVIDER - PROVIDER TOKENS
FREE:[LAST:[Dr. NAIDA Teague],PHONE:[(   )    -],FAX:[(   )    -]],FREE:[LAST:[[facility]],PHONE:[phone]],FAX:[(   )    -],ADDRESS:[[address] [place] [address]]]

## 2019-06-29 NOTE — DISCHARGE NOTE PROVIDER - HOSPITAL COURSE
52 yo male w h/o CAD (2 stents 6-7 yrs ago), DM, HTN, HLD, former smoker who presented to OSH with abd pain, transferred due to concern for ACS/EKG changes. No evidence of ACS but patient with new echo findings.         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

## 2019-06-29 NOTE — DISCHARGE NOTE PROVIDER - NSDCFUADDINST_GEN_ALL_CORE_FT
You must follow up for a repeat blood level (to check INR) either Monday or Tuesday of this coming week (July 1 or July 2)  Call the cardiology Clinic at [facility] to make an appointment - YOU CAN GET YOU BLOOD TEST THERE IF YOU CAN GET IN BY TUESDAY  Make appointments to follow up with your physicians  Bring all discharge paperwork including discharge medication list to follow up appointments

## 2019-06-29 NOTE — CHART NOTE - NSCHARTNOTEFT_GEN_A_CORE
Directed by Dr De León to discharge this patient on current medications including Coumadin 6mg daily -   Patient to follow up for INR check 2 - 3 days from today  Discharge completed as directed  Discussed with patient - he is in agreement with plan

## 2019-07-04 PROBLEM — E78.5 HYPERLIPIDEMIA, UNSPECIFIED: Chronic | Status: ACTIVE | Noted: 2019-06-20

## 2019-07-04 PROBLEM — E11.9 TYPE 2 DIABETES MELLITUS WITHOUT COMPLICATIONS: Chronic | Status: ACTIVE | Noted: 2019-06-20

## 2019-07-04 PROBLEM — I25.10 ATHEROSCLEROTIC HEART DISEASE OF NATIVE CORONARY ARTERY WITHOUT ANGINA PECTORIS: Chronic | Status: ACTIVE | Noted: 2019-06-20

## 2019-07-04 PROBLEM — I10 ESSENTIAL (PRIMARY) HYPERTENSION: Chronic | Status: ACTIVE | Noted: 2019-06-20

## 2019-07-08 ENCOUNTER — APPOINTMENT (OUTPATIENT)
Dept: CARDIOLOGY | Facility: CLINIC | Age: 54
End: 2019-07-08
Payer: COMMERCIAL

## 2019-07-08 ENCOUNTER — NON-APPOINTMENT (OUTPATIENT)
Age: 54
End: 2019-07-08

## 2019-07-08 VITALS — DIASTOLIC BLOOD PRESSURE: 85 MMHG | SYSTOLIC BLOOD PRESSURE: 127 MMHG

## 2019-07-08 VITALS
WEIGHT: 200 LBS | BODY MASS INDEX: 31.39 KG/M2 | HEART RATE: 63 BPM | SYSTOLIC BLOOD PRESSURE: 131 MMHG | HEIGHT: 67 IN | OXYGEN SATURATION: 98 % | DIASTOLIC BLOOD PRESSURE: 85 MMHG

## 2019-07-08 DIAGNOSIS — Z83.3 FAMILY HISTORY OF DIABETES MELLITUS: ICD-10-CM

## 2019-07-08 DIAGNOSIS — Z87.891 PERSONAL HISTORY OF NICOTINE DEPENDENCE: ICD-10-CM

## 2019-07-08 DIAGNOSIS — Z78.9 OTHER SPECIFIED HEALTH STATUS: ICD-10-CM

## 2019-07-08 DIAGNOSIS — E11.9 TYPE 2 DIABETES MELLITUS W/OUT COMPLICATIONS: ICD-10-CM

## 2019-07-08 PROBLEM — Z00.00 ENCOUNTER FOR PREVENTIVE HEALTH EXAMINATION: Status: ACTIVE | Noted: 2019-07-08

## 2019-07-08 LAB
ALBUMIN SERPL ELPH-MCNC: 4.3 G/DL
ALP BLD-CCNC: 97 U/L
ALT SERPL-CCNC: 29 U/L
ANION GAP SERPL CALC-SCNC: 11 MMOL/L
AST SERPL-CCNC: 16 U/L
BILIRUB SERPL-MCNC: 0.2 MG/DL
BUN SERPL-MCNC: 25 MG/DL
CALCIUM SERPL-MCNC: 10.4 MG/DL
CHLORIDE SERPL-SCNC: 105 MMOL/L
CO2 SERPL-SCNC: 25 MMOL/L
CREAT SERPL-MCNC: 1.16 MG/DL
GLUCOSE SERPL-MCNC: 224 MG/DL
INR PPP: 1.64 RATIO
POTASSIUM SERPL-SCNC: 5.4 MMOL/L
PROT SERPL-MCNC: 8.1 G/DL
PT BLD: 19 SEC
SODIUM SERPL-SCNC: 141 MMOL/L

## 2019-07-08 PROCEDURE — 93000 ELECTROCARDIOGRAM COMPLETE: CPT

## 2019-07-08 PROCEDURE — 99214 OFFICE O/P EST MOD 30 MIN: CPT

## 2019-07-08 RX ORDER — ATORVASTATIN CALCIUM 10 MG/1
10 TABLET, FILM COATED ORAL
Qty: 90 | Refills: 3 | Status: ACTIVE | COMMUNITY
Start: 2019-07-08

## 2019-07-08 RX ORDER — ASPIRIN 81 MG/1
81 TABLET, COATED ORAL
Refills: 0 | Status: ACTIVE | COMMUNITY

## 2019-07-08 RX ORDER — METOPROLOL TARTRATE 25 MG/1
25 TABLET, FILM COATED ORAL TWICE DAILY
Refills: 0 | Status: ACTIVE | COMMUNITY
Start: 2019-07-08

## 2019-07-08 NOTE — HISTORY OF PRESENT ILLNESS
[FreeTextEntry1] : Patient  is a 53 year old male recently discharged from [facility] after admission for chest and abdom pain.  He has hx of MI at age 46, stents, type II diabetes, htn and elevated cholesterol.  He has not seen a cardiologist in over 2 years.  Last seen at [facility].  \par Workup showed non obstructive CAD, moderate LV dysfunction and apical thrombus.\par Has returned to work and has no sx.\par tob quit 30 years ago\par alcohol  occasional\par no family hx of CAD\par he plans to follow with a different cardiac center after his PCP makes a referral.\par He reports having blood work at the order of the [facility] md but never received a follow up call.

## 2019-07-08 NOTE — DISCUSSION/SUMMARY
[Coronary Artery Disease] : coronary artery disease [Chronic Systolic Heart Failure] : chronic systolic congestive heart failure [Essential Hypertension] : essential hypertension [Stable] : stable [None] : none [___ Month(s)] : [unfilled] month(s) [FreeTextEntry4] : lv thrombus [FreeTextEntry1] : 53 year old with hx of prior stents admitted with abdom and chest pain.  non obstructive CAD.  LV thrombus.  Inr from last week post d/c was 1.8.  never notified by hospital team.  needs labs today.\par he will be switching to a cardiologist close to his home../

## 2019-07-08 NOTE — PHYSICAL EXAM
[General Appearance - Well Developed] : well developed [Normal Appearance] : normal appearance [Well Groomed] : well groomed [General Appearance - Well Nourished] : well nourished [No Deformities] : no deformities [General Appearance - In No Acute Distress] : no acute distress [Normal Conjunctiva] : the conjunctiva exhibited no abnormalities [Eyelids - No Xanthelasma] : the eyelids demonstrated no xanthelasmas [Normal Oral Mucosa] : normal oral mucosa [No Oral Pallor] : no oral pallor [No Oral Cyanosis] : no oral cyanosis [Normal Jugular Venous A Waves Present] : normal jugular venous A waves present [Normal Jugular Venous V Waves Present] : normal jugular venous V waves present [No Jugular Venous Benítez A Waves] : no jugular venous benítez A waves [Normal] : normal [Normal Rate] : normal [Rhythm Regular] : regular [Normal S1] : normal S1 [Normal S2] : normal S2 [No Murmur] : no murmurs heard [Right Carotid Bruit] : no bruit heard over the right carotid [Left Carotid Bruit] : no bruit heard over the left carotid [No Abnormalities] : the abdominal aorta was not enlarged and no bruit was heard [No Pitting Edema] : no pitting edema present [Rt] : no varicose veins of the right leg [Lt] : no varicose veins of the left leg [Respiration, Rhythm And Depth] : normal respiratory rhythm and effort [Exaggerated Use Of Accessory Muscles For Inspiration] : no accessory muscle use [Auscultation Breath Sounds / Voice Sounds] : lungs were clear to auscultation bilaterally [Abdomen Soft] : soft [Abdomen Tenderness] : non-tender [Abdomen Mass (___ Cm)] : no abdominal mass palpated [Abnormal Walk] : normal gait [Gait - Sufficient For Exercise Testing] : the gait was sufficient for exercise testing [Nail Clubbing] : no clubbing of the fingernails [Cyanosis, Localized] : no localized cyanosis [Petechial Hemorrhages (___cm)] : no petechial hemorrhages [Skin Color & Pigmentation] : normal skin color and pigmentation [] : no rash [No Venous Stasis] : no venous stasis [Skin Lesions] : no skin lesions [No Skin Ulcers] : no skin ulcer [No Xanthoma] : no  xanthoma was observed [Oriented To Time, Place, And Person] : oriented to person, place, and time [Affect] : the affect was normal [Mood] : the mood was normal [No Anxiety] : not feeling anxious

## 2019-07-08 NOTE — REASON FOR VISIT
[Follow-Up - From Hospitalization] : follow-up of a recent hospitalization for [Coronary Artery Disease] : coronary artery disease [Heart Failure] : congestive heart failure [Hyperlipidemia] : hyperlipidemia [Hypertension] : hypertension [FreeTextEntry1] : LV thrombus

## 2019-07-16 LAB
INR PPP: 2.37 RATIO
PT BLD: 27.7 SEC

## 2019-07-24 LAB
INR PPP: 1.56 RATIO
PT BLD: 18.2 SEC

## 2019-07-30 ENCOUNTER — OTHER (OUTPATIENT)
Age: 54
End: 2019-07-30

## 2019-07-30 LAB
INR PPP: 2.82 RATIO
PT BLD: 33.5 SEC

## 2019-08-06 LAB
INR PPP: 1.98 RATIO
PT BLD: 23.1 SEC

## 2019-08-12 ENCOUNTER — NON-APPOINTMENT (OUTPATIENT)
Age: 54
End: 2019-08-12

## 2019-08-12 ENCOUNTER — APPOINTMENT (OUTPATIENT)
Dept: CARDIOLOGY | Facility: CLINIC | Age: 54
End: 2019-08-12
Payer: COMMERCIAL

## 2019-08-12 VITALS
HEART RATE: 61 BPM | SYSTOLIC BLOOD PRESSURE: 138 MMHG | WEIGHT: 200 LBS | OXYGEN SATURATION: 98 % | DIASTOLIC BLOOD PRESSURE: 84 MMHG | HEIGHT: 67 IN | BODY MASS INDEX: 31.39 KG/M2

## 2019-08-12 VITALS — SYSTOLIC BLOOD PRESSURE: 134 MMHG | DIASTOLIC BLOOD PRESSURE: 84 MMHG

## 2019-08-12 DIAGNOSIS — I25.10 ATHEROSCLEROTIC HEART DISEASE OF NATIVE CORONARY ARTERY W/OUT ANGINA PECTORIS: ICD-10-CM

## 2019-08-12 DIAGNOSIS — I10 ESSENTIAL (PRIMARY) HYPERTENSION: ICD-10-CM

## 2019-08-12 DIAGNOSIS — I51.3 INTRACARDIAC THROMBOSIS, NOT ELSEWHERE CLASSIFIED: ICD-10-CM

## 2019-08-12 DIAGNOSIS — E78.5 HYPERLIPIDEMIA, UNSPECIFIED: ICD-10-CM

## 2019-08-12 LAB
INR PPP: 2.34 RATIO
PT BLD: 27.4 SEC

## 2019-08-12 PROCEDURE — 93000 ELECTROCARDIOGRAM COMPLETE: CPT

## 2019-08-12 PROCEDURE — 99214 OFFICE O/P EST MOD 30 MIN: CPT

## 2019-08-12 RX ORDER — WARFARIN 2 MG/1
2 TABLET ORAL DAILY
Qty: 90 | Refills: 1 | Status: ACTIVE | COMMUNITY
Start: 2019-07-08 | End: 1900-01-01

## 2019-08-12 RX ORDER — LOSARTAN POTASSIUM 100 MG/1
100 TABLET, FILM COATED ORAL DAILY
Qty: 90 | Refills: 1 | Status: ACTIVE | COMMUNITY
Start: 1900-01-01 | End: 1900-01-01

## 2019-08-12 RX ORDER — GLIPIZIDE 10 MG/1
10 TABLET ORAL
Refills: 0 | Status: ACTIVE | COMMUNITY
Start: 2019-08-12

## 2019-08-12 RX ORDER — SITAGLIPTIN AND METFORMIN HYDROCHLORIDE 50; 1000 MG/1; MG/1
50-1000 TABLET, FILM COATED, EXTENDED RELEASE ORAL
Refills: 0 | Status: DISCONTINUED | COMMUNITY
Start: 2019-07-08 | End: 2019-08-12

## 2019-08-12 RX ORDER — INSULIN GLARGINE 100 [IU]/ML
100 INJECTION, SOLUTION SUBCUTANEOUS
Refills: 0 | Status: DISCONTINUED | COMMUNITY
End: 2019-08-12

## 2019-08-12 RX ORDER — WARFARIN 6 MG/1
6 TABLET ORAL
Qty: 90 | Refills: 1 | Status: ACTIVE | COMMUNITY
Start: 1900-01-01 | End: 1900-01-01

## 2019-08-12 NOTE — DISCUSSION/SUMMARY
[Coronary Artery Disease] : coronary artery disease [Hyperlipidemia] : hyperlipidemia [Essential Hypertension] : essential hypertension [Stable] : stable [None] : none [___ Month(s)] : [unfilled] month(s) [FreeTextEntry1] : 53 year old with hx of prior stents admitted with abdomen and chest pain.  non obstructive CAD.  LV thrombus.  Inr from last week post d/c was 1.8.  never notified by hospital team.  needs labs today.\par he will be switching to a cardiologist close to his home../\par \par 8/12/2019  has not yet seen new cardiologist in [place].  no blood work provided for review but INR has been therapeutic over the last moth for LV thrombus.  Will try to obtain blood work from PCP and adjust statin does if needed.  No sx of heart failure,.  increase losartan to 100 mg for better bp control.

## 2019-08-12 NOTE — PHYSICAL EXAM
[General Appearance - Well Developed] : well developed [Normal Appearance] : normal appearance [Well Groomed] : well groomed [General Appearance - Well Nourished] : well nourished [No Deformities] : no deformities [General Appearance - In No Acute Distress] : no acute distress [Normal Conjunctiva] : the conjunctiva exhibited no abnormalities [Eyelids - No Xanthelasma] : the eyelids demonstrated no xanthelasmas [Normal Oral Mucosa] : normal oral mucosa [No Oral Pallor] : no oral pallor [No Oral Cyanosis] : no oral cyanosis [Normal Jugular Venous A Waves Present] : normal jugular venous A waves present [Normal Jugular Venous V Waves Present] : normal jugular venous V waves present [No Jugular Venous Benítez A Waves] : no jugular venous benítez A waves [Respiration, Rhythm And Depth] : normal respiratory rhythm and effort [Exaggerated Use Of Accessory Muscles For Inspiration] : no accessory muscle use [Auscultation Breath Sounds / Voice Sounds] : lungs were clear to auscultation bilaterally [Normal] : normal [Normal Rate] : normal [Rhythm Regular] : regular [Normal S1] : normal S1 [Normal S2] : normal S2 [No Murmur] : no murmurs heard [Left Carotid Bruit] : no bruit heard over the left carotid [Right Carotid Bruit] : no bruit heard over the right carotid [No Abnormalities] : the abdominal aorta was not enlarged and no bruit was heard [No Pitting Edema] : no pitting edema present [Rt] : no varicose veins of the right leg [Lt] : no varicose veins of the left leg [Abdomen Soft] : soft [Abdomen Tenderness] : non-tender [Abnormal Walk] : normal gait [Abdomen Mass (___ Cm)] : no abdominal mass palpated [Gait - Sufficient For Exercise Testing] : the gait was sufficient for exercise testing [Nail Clubbing] : no clubbing of the fingernails [Cyanosis, Localized] : no localized cyanosis [Petechial Hemorrhages (___cm)] : no petechial hemorrhages [Skin Color & Pigmentation] : normal skin color and pigmentation [] : no rash [No Venous Stasis] : no venous stasis [Skin Lesions] : no skin lesions [No Skin Ulcers] : no skin ulcer [No Xanthoma] : no  xanthoma was observed [Oriented To Time, Place, And Person] : oriented to person, place, and time [Affect] : the affect was normal [Mood] : the mood was normal [No Anxiety] : not feeling anxious

## 2019-08-12 NOTE — HISTORY OF PRESENT ILLNESS
[FreeTextEntry1] : Patient  is a 53 year old male recently discharged from [facility] after admission for chest and abdom pain.  He has hx of MI at age 46, stents, type II diabetes, htn and elevated cholesterol.  He has not seen a cardiologist in over 2 years.  Last seen at [facility].  \par Workup showed non obstructive CAD, moderate LV dysfunction and apical thrombus.\par Has returned to work and has no sx.\par tob quit 30 years ago\par alcohol  occasional\par no family hx of CAD\par he plans to follow with a different cardiac center after his PCP makes a referral.\par He reports having blood work at the order of the [facility] md but never received a follow up call.\par \par 8/12/2019  has not yet seen new cardiologist in [place].  is on vacation.  INR has been therapeutic.  no chest pain, dyspnea.  has seen pcp who started glipized, stopped insulin and janumet.

## 2019-09-03 LAB
INR PPP: 2.18 RATIO
PT BLD: 25.7 SEC

## 2020-10-29 NOTE — DIETITIAN INITIAL EVALUATION ADULT. - PROBLEM/PLAN-8
5 views of the lumbar spine 10/29/2020

 

INDICATION: Low back pain. Bilateral pars defects. Bilateral leg pain.

 

COMPARISON STUDY: Lumbar spine radiographs July 13, 2020.

 

Discussion: No evidence of acute fractures identified. Vertebral body 

heights are maintained. There is disc space narrowing at L5-S1 level. 

There is grade 1 anterolisthesis of L5 on S1 which appears to be secondary

to bilateral pars interarticularis defects (bilateral spondylolysis). 

Associated endplate sclerosis is seen. Oblique views are somewhat limited 

though some degree of neural foraminal compromise could be present. A 

component of spinal stenosis is likely given the appearance. The overall 

radiographic appearance is similar to comparison radiographs.

 

IMPRESSION: Similar degenerative changes of the lumbar spine most 

prominent at the L5-S1 level, where there is grade 1 anterolisthesis of L5

on S1, secondary to bilateral spondylolysis. Consider further 

characterization with MRI as clinically indicated.

 

 

 

 

Electronically signed by: Pepe Bran MD (10/29/2020 12:51 PM) [id number]
cough
DISPLAY PLAN FREE TEXT

## 2021-10-06 PROBLEM — I10 ESSENTIAL HYPERTENSION: Status: ACTIVE | Noted: 2019-07-08

## 2022-06-26 NOTE — H&P ADULT - PROBLEM SELECTOR PROBLEM 9
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 Her small angiomyolipomas bilaterally have not changed since her last ultrasound. She has no urologic symptoms. I recommended a baseline MRI for better assessment of the nature of the AMLs . All questions were answered. Hyperlipidemia, unspecified hyperlipidemia type

## 2023-01-13 NOTE — ED PROVIDER NOTE - MUSCULOSKELETAL NEGATIVE STATEMENT, MLM
Left message for patient to call back to reschedule new patient appointment.   no back pain, no gout, no musculoskeletal pain, no neck pain, and no weakness.

## 2023-03-21 NOTE — PATIENT PROFILE ADULT - LONGEST PERIOD TOBACCO-FREE
care partner,?   ?  Quentin Celeste MD?    30 years ago

## 2023-04-28 NOTE — ED ADULT TRIAGE NOTE - ARRIVAL INFO ADDITIONAL COMMENTS
Cardiovascular/Thoracic Surgery Transfer of Care: 4/28/2023    PCP: Godwin Guerrero DO    Cardiologist: Khang Mayorga MD    Requesting Physician: Khang Mayorga MD    Reason for consultation: surgical evaluation of CAD    Chief Complaint:   Chief Complaint   Patient presents with   • Multiple Falls   • Back Pain     HPI:  Chauncey Burroughs is a 61 year-old male with a significant PMH for CAD s/p NSTEMI with GUADALUPE to mCx, OM2, and mRCA (10/31/2017), STEMI s/p GUADALUPE to dRCA and pRCA (7/12/2022), GUADALUPE to mCx, dLAD, mLAD, and pLAD (8/12/2022) on Plavix with last dose 4/25/23 at 0920, early family history of CAD (brother underwent CABG at 40), HTN, HLD, remote tobacco use (quit 2012), poorly-controlled type 2 DM, traumatic fall (1987) s/p multiple back surgeries, s/p left nephrectomy (1998, secondary to complication/bleeding following spinal surgery), chronic osteomyelitis (L3-L5), severe peripheral neuropathy, chronic pain with history of intrathecal pump and nerve stimulator (intrathecal pump removed per patient, pain contract with Dr. Lopez), osteomyelitis s/p right second and third toe amputation, anemia of chronic disease, DVT secondary to PICC line, and GERD. He presented to [facility] [place] on 4/23/23 for evaluation of back pain and multiple falls. He had been recently evaluated for leg weakness and incontinence by [facility] with MRI thoracic spine (3/23/23) that did not demonstrate any acute findings to explain patient symptoms. He was referred to Neurology. Initial work up in the ED with EKG demonstrated nonspecific T-wave abnormality with elevated high-sensitivity troponin 174 -> 458. He was also noted to have elevated WBC 12.6 and lactic acid 3.1. CT head wo contrast was obtained with no acute intracranial findings. CT abdomen pelvis with contrast was obtained with no acute findings and incidential finding of multiple small pulmonary nodules. He was admitted for further medical evaluation. Neurology was consulted and MRI lumbar spine  was ordered. Limited MRI was obtained that demonstrated stable postsurgical and degenerative changes, however MRI was aborted due to patient developing heat and pain around surgical hardware. CT lumbar spine was obtained that demonstrated multilevel degenerative changes in the lumbar spine and postsurgical changes of the thoracic and lumbar spine. Neurology has recommended further imaging with lumbar myelogram and postmyelogram CT for suspected disc fragment irritating nerve root and causing left lumbosacral radiculopathy. Cardiology was also consulted and limited echocardiogram was obtained on 4/24/23 that demonstrated reduced LVEF 48%. He subsequently underwent cardiac catheterization on 4/27/23 that revealed severe multivessel CAD. He has been referred for surgical evaluation.     He agrees to the history above. He reports chest pain, shortness of breath, palpitations, orthopnea, PND, and increased lower extremity swelling. He states chest pain and shortness of breath first occurred yesterday following cardiac catheterization. He has had no recurrence since application of topical nitroglycerin. He states he has been experiencing palpitations, orthopnea, PND, and lower extremity swelling for the past month. He otherwise denies any dizziness, lightheadedness, nausea, or vomiting. He resides alone and performs all ADLs independently with the assistance of walker. He has a neighbor who assists with grocery shopping. He requests his brother Scotty to be his primary contact while hospitalized.    Past Medical History    HTN (hypertension)                                            Spinal stenosis of lumbar region                              Anemia of chronic disease                                     Primary generalized (osteo)arthritis                          Chronic gout                                                  Acquired solitary kidney                        2006            Comment: Cx: Left nephrectomy  during lumbar surgery    Chronic kidney disease                                        CAD (coronary artery disease)                                   Comment: S/P Stents  X 3    NSTEMI (non-ST elevated myocardial infarction)*                 Comment: Ramesh Mayorga    Chronic pain syndrome                                           Comment: Failed back syndrome    GERD (gastroesophageal reflux disease)                        Type 2 diabetes mellitus (CMD)                                Deep vein thrombosis (DVT) of left upper extre*                 Comment: 2/2 PIC line    Osteomyelitis, chronic (CMD)                                    Comment: L3-L5    Chronic prescription opiate use                                 Comment: Pain contract Dr. John Johnson    Thoracic spinal stenosis                                      Obesity (BMI 35.0-39.9 without comorbidity)                   STEMI (ST elevation myocardial infarction) (CM* 7/12/2022     Past Surgical History    NEPHRECTOMY                                     1998            Comment: JORGE A/Dr Jensen: due to bleeding afte posterior                spinal surgery     PAST SURGICAL HISTORY                                           Comment: Intrathecal pump and neurostimulation implants.               both removed. all 3    FRACTURE SURGERY                                1980            Comment: Left wrist fracture repair    AMPUTATION TOE,MT-P JT                          04/09/2014      Comment: Right third toe    REPAIR OF DURAL CSF LEAK OR PSEUDOMENINGOCELE * 04/05/2016      Comment: Repair of dural tear with fascial graft  of                L2-3/[facility] Dr Boyer/ 4/5/16    AMPUTATION TOE,MT-P JT                          02/18/2018      Comment: Dr. Palomo Rt second    ESOPHAGOGASTRODUODENOSCOPY TRANSORAL FLEX W/BX* 01/30/2019      Comment: Gastritis,neg h pylori,.     COLONOSCOPY REMOVE LESIONS BY SNARE             01/30/2019      Comment: Rpt 5  yrs,adenoma                x1,hemorrhoids,.    CORONARY ANGIOPLASTY WITH STENT PLACEMENT       10/31/2017      Comment: PTCA/GUADALUPE X 3 to Mid Cx lesion, Ostium of 2nd                Marginal, Mid RCA    BACK SURGERY                                    26 total        Comment: Back surgeries with fusions    LAMINEC/FACETECT/FORAMIN,LUMBAR                 03/14/2013      Comment: L1-2, L2-3 Lumbar Lami - Dr. Boyer    LAMINEC/FACETECT/FORAMIN,LUMBAR                 03/24/2016      Comment: Revision bilateral lumbar myriam-laminectomy L2-3               with partial medial facetectomy and                foraminotomy of the exiting nerve(s)./[facility] Dr Boyer/ 3/24/16    INCISION AND DRAINAGE FOOT                      06/18/2013      Comment: Right foot    ECHO M-MODE/2D/DOPPLER (ROUTINE)                11/01/2017      Comment: S/P PTCA with GUADALUPE X 3 --- LVEF - 70%  Normal                wall motion    INTRATHECAL PUMP IMPLANTATION                   05/2014         Comment: Pumps X 2 - formation of granulomas    TONSILLECTOMY                                                   Comment: @ age 3    BACK SURGERY                                    02/11/2019      Comment: Revision lumbar laminectomy L1-2, L2-3,                Posterior pedicle screw instrumentation of                T10-L3/[facility] 2/11/19     BACK SURGERY                                    02/18/2019      Comment: Revision lumbar laminectomy L1-2, L2-3 and                dural repair/[facility] ST 2/18/19    CARDIAC CATHETERIZATION                         07/12/2022    BACK SURGERY                                    02/20/2020      Comment: Thoracic laminectomy with partial medial                facetectomy and foraminotomy of T8-9,                T9-10/[facility] 2/20/20    BACK SURGERY                                    11/30/2020      Comment: REVISION THORACIC 10-LUMBAR 3 POSTERIOR                INSTRUMENTATION & FUSION WITH EXPLORATION  OF                FUSION BED  & L2 AND L3 OSTEOTOMY/Dr. Clement    ALLERGIES:   Allergen Reactions   • Cephalosporins Other (See Comments) and ANAPHYLAXIS     Cardiac arrest - patient states happend in surgery and it remembers it was a serious reaction.  5/15/2020 - As patient  Reaction:  Cardiac arrest    How quickly did the reaction occur?:  immediately   When did the reaction occur?:  ~2012 during surgery    How was the reaction managed?: stop medication    Beta-lactams taken since the reaction: cefepime x2 doses in 2019, no mention of reaction    Has the patient had penicillin skin testing?: unsure     • Penicillins ANAPHYLAXIS, Other (See Comments) and HIVES     cardiac arrest  5/15/2020 - As patient  Reaction:  Cardiac arrest    How quickly did the reaction occur?:  immediately   When did the reaction occur?:  ~2012 during surgery    How was the reaction managed?: stop medication    Beta-lactams taken since the reaction: cefepime x2 doses in 2019, no mention of reaction    Has the patient had penicillin skin testing?: unsure     • Absorbine Arthritis Strength SWELLING, NAUSEA and CARDIAC DISTURBANCES   • Ace Inhibitors Other (See Comments)     Hyperkalemia, patient has a history of a low aldosterone  and only has one kidney, has a propensity toward hyperkalemia   • Amlodipine RASH   • Cat Dander Other (See Comments)     Eyes itchy    • Cyclobenzaprine NAUSEA, Nausea & Vomiting and PRURITUS   • Cymbalta [Duloxetine Hcl] RASH   • Doxycycline HIVES   • Gabapentin HIVES   • Gabitril SWELLING and RASH   • Lyrica HIVES   • Menthol NAUSEA, Other (See Comments) and SWELLING   • Nortriptyline RASH   • Nsaids HIVES   • Pregabalin HIVES   • Tiagabine SWELLING   • Duloxetine HIVES   • Ketorolac Other (See Comments)   • Tizanidine HIVES, Other (See Comments) and RASH       Current Facility-Administered Medications   Medication   • docusate sodium-sennosides (SENOKOT S) 50-8.6 MG 2 tablet   •  polyethylene glycol (MIRALAX) packet 17 g   • oxyCODONE (IMM REL) (ROXICODONE) tablet 5 mg   • HYDROmorphone (DILAUDID) injection 0.5 mg   • morphine injection 2 mg   • magnesium hydroxide (MILK OF MAGNESIA) 400 MG/5ML suspension 30 mL   • sodium chloride 0.9% infusion   • sodium chloride 0.9 % flush bag 25 mL   • sodium chloride (PF) 0.9 % injection 2 mL   • hydrALAZINE (APRESOLINE) injection 10 mg   • nitroGLYCERIN topical (NITRO-BID) 2 % ointment 1 inch   • morphine SR (MS CONTIN) tablet 15 mg   • ondansetron (ZOFRAN) injection 4 mg   • cyclobenzaprine (FLEXERIL) tablet 10 mg   • allopurinol (ZYLOPRIM) tablet 300 mg   • aspirin chewable 81 mg   • atorvastatin (LIPITOR) tablet 80 mg   • carvedilol (COREG) tablet 25 mg   • [Held by provider] clopidogrel (PLAVIX) tablet 75 mg   • ferrous sulfate (65 mg Fe per 325 mg) tablet 325 mg   • glimepiride (AMARYL) tablet 4 mg   • pioglitazone (ACTOS) tablet 30 mg   • sodium chloride 0.9 % flush bag 25 mL   • sodium chloride (PF) 0.9 % injection 2 mL   • [Held by provider] enoxaparin (LOVENOX) injection 40 mg   • insulin lispro (ADMELOG,HumaLOG) - Correction Dose   • dextrose 50 % injection 25 g   • dextrose 50 % injection 12.5 g   • glucagon (GLUCAGEN) injection 1 mg   • dextrose (GLUTOSE) 40 % gel 15 g   • dextrose (GLUTOSE) 40 % gel 30 g       Social History     Tobacco Use   • Smoking status: Former     Current packs/day: 0.00     Average packs/day: 0.5 packs/day for 31.8 years (15.9 pk-yrs)     Types: Cigarettes     Start date: 1980     Quit date: 2012     Years since quittin.2   • Smokeless tobacco: Never   Vaping Use   • Vaping status: never used     Passive vaping exposure: Yes   Substance Use Topics   • Alcohol use: Not Currently   • Drug use: Never       Living arrangements:   alone.    Family History   Problem Relation Age of Onset   • COPD Mother          74, copd    • Glaucoma Father    • High cholesterol Father          65,  of unknow  cause.in sleep. schizophrenia. ptsd.   • Hypertension Father    • Other Father         PTSD   • Patient is unaware of any medical problems Sister    • Patient is unaware of any medical problems Sister    • Heart Brother         Age 49 lots of heart dzz.   • Heart disease Brother    • Patient is unaware of any medical problems Brother    • Systemic Lupus Erythematosus Brother         Diverticulitis   • Patient is unaware of any medical problems Daughter    • Patient is unaware of any medical problems Son    • Patient is unaware of any medical problems Son    • Diabetes Paternal Grandmother    • Stroke Paternal Grandfather    • Heart disease Paternal Grandfather        Pertinent Family Cardiac History: Yes    Review of Systems:    General:reports  chills, weight change and appetite changes and denies  fevers, night sweats and fatigue  Psych:reports  insomnia and denies  anxiety, depression and memory problems  Neuro: reports paresthesias, numbness, tremor and weakness and denies seizures, headache, paralysis, lightheadedness, dizziness and aphasia  HEENT: reports tinnitus and denies diplopia, blurred vision, eye pain, hearing loss, vertigo, sinusitis, congestion, epistaxis and dysphagia  Pulmonary: reports shortness of breath and denies wheezing, cough, sputum production, hemoptysis and dyspnea on exertion  CV: reports palpitations, orthopnea and Paroxysmal nocturnal dyspnea and denies syncope, angina, murmur and claudication  GI: reports abdominal pain and denies nausea, vomiting, dysphagia, constipation, diarrhea, heartburn, hematemesis and melena  : reports infections, incontinence, hematuria and retention and denies dysuria, frequency, hesitancy, discharge and nocturia  MS: reports muscle weakness, pain, joint stiffness and swelling and denies instability, myalgias and fractures  Dermatology: denies rashes, sores, lesions and color changes  Endocrine: denies heat-cold intolerance, excessive sweating, polyuria,  polydipsia and polyphagia  Heme/Lymph: reports transfusions and denies anemia, easy bruising, bleeding and lymphadenopathy    Physical Exam:    Visit Vitals  BP (!) 155/83 (BP Location: LUE - Left upper extremity, Patient Position: Sitting)   Pulse 87   Temp 98.3 °F (36.8 °C) (Oral)   Resp 18   Ht 5' 6\" (1.676 m)   Wt 98.8 kg (217 lb 12.8 oz)   SpO2 95%   BMI 35.15 kg/m²     Ht Readings from Last 1 Encounters:   04/28/23 5' 6\" (1.676 m)     Wt Readings from Last 1 Encounters:   04/28/23 98.8 kg (217 lb 12.8 oz)     Body mass index is 35.15 kg/m².    Tele: Normal sinus rhythm     General: no acute distress, well developed, well nourished and well groomed  Eyes: normal sclerae, normal conjunctivae and normal lids  Mouth: dentition adequate repair, no cyanosis of oral mucosa and no pallor of oral mucosa  Neck: no trachea deviation/tenderness/masses, no thyromegaly, no JVD, no cervical adenopathy and no supraclavicular adenopathy  Cardiovascular:normal sinus rhythm, no murmur  Extremities: no stasis changes/hx DVT/vein stripping/varicose veins in bilateral lower extremities  Respiratory: no wheezing/crackles/rhonchi/stridor and no accessory muscle use or retractions  Abdomen: no tenderness, no masses and no hepatosplenomegaly  Skin: warm and dry  Psych: alert, no acute distress, oriented x 3 and normal mood and affect    Labs:  Lab Results   Component Value Date    SODIUM 137 04/28/2023    POTASSIUM 3.6 04/28/2023    CHLORIDE 102 04/28/2023    CO2 23 04/28/2023    GLUCOSE 143 (H) 04/28/2023    BUN 10 04/28/2023    CREATININE 0.67 04/28/2023    CALCIUM 8.9 04/28/2023    JEANIE 1.25 01/27/2015    ALBUMIN 3.0 (L) 04/24/2023    BILIRUBIN 0.4 04/24/2023    LACTA 0.9 04/24/2023    AST 12 04/24/2023    PHOS 3.2 01/11/2021    INR 1.1 02/17/2023     Lab Results   Component Value Date    PTT 28 02/17/2023    WBC 9.6 04/28/2023    HGB 12.8 (L) 04/28/2023    HCT 37.9 (L) 04/28/2023     04/28/2023    CORTDX 1.1 11/30/2021     RAPDTR <0.02 12/04/2020    CPK 35 (L) 10/23/2019    CRP 1.2 (H) 04/23/2023    TSH 1.626 04/23/2023    CRISTIANE 50 06/10/2017       Diagnostics:  CT HEAD WO CONTRAST 4/23/23:  IMPRESSION:   1.  No acute intracranial findings.    CT ABDOMEN PELVIS W CONTRAST 4/23/23:  IMPRESSION:   1.  No acute findings of the abdomen or pelvis.  2.  Extensive postsurgical changes of the spine. Hardware appears stable/intact.  3.  Intrathecal catheter that does not indicate with any extrathecal line.  4.  Nodular focus within the soft tissues along the left flank adjacent to one of the electrode catheters possibly scarring or fibrosis although nonspecific.  5.  Stable appearing left adrenal nodule.  6.  Redemonstration of multiple small bibasilar pulmonary nodules. Please follow-up per Fleischner Society recommendations.  7.  Absence of the left kidney.  8.  Uncomplicated diverticulosis.    MRI LUMBAR SPINE WO CONTRAST 4/24/23:  IMPRESSION:   Stable postoperative changes and degenerative changes.    EKG 4/27/23:  Normal sinus rhythm  Low voltage QRS  Cannot rule out Inferior infarct (cited on or before 13-JUL-2022)  Cannot rule out Anteroseptal infarct, age undetermined  Abnormal ECG  When compared with ECG of 27-APR-2023 15:55,  Minimal criteria for Anteroseptal infarct are now present  Nonspecific T wave abnormality, worse in Lateral leads    CT LUMBAR SPINE WO CONTRAST 4/26/23:  IMPRESSION:  1. No fractures. Diffuse osteopenia.  2. Multilevel degenerative changes in the lumbar spine as detailed above with the worst disease at the levels of L3-L5.  3. Postsurgical changes of posterior spinal fusion instrumentation from T10 to L3 and posterior decompression from T8-T9 and L1-L5. No instrumentation fracture or periprosthetic loosening. Osseous fusion across L1-L2 and L3-L4.    LIMITED ECHOCARDIOGRAM 4/24/23:  FINAL IMPRESSIONS:  Mildly reduced left ventricular systolic function with ejection fraction of 48 %.  Mildly increased left  ventricular wall thickness.  No pericardial effusion.  Definity contrast administered to better visualize endocardial definition.  Compared to prior study dated 07/13/2022 new wall motion abnormality seen.    CARDIAC CATHETERIZATION 4/27/23:  Coronary Findings    Diagnostic  Dominance: Right  Left Main   The vessel is greater than 4 mm. The vessel is free of significant disease. Large vessel giving rise to left anterior descending coronray artery, left circumflex coronary artery, and small ramus intermedius.      Left Anterior Descending   Previous stents noted in the proximal, mid and distal left anterior descending coronary artery. 20% stenosis at previously placed stent in the proximal LAD, 95-99% tandem stenoses at the mid LAD. The LAD gives rise to two small diagonal branches which are patent.      Ramus Intermedius   The vessel is free of significant disease. Small caliber vessel with non-obstructive CAD.      Left Circumflex   Large vessel with previously placed stents in the mid LCx and at the ostia of OM1 and OM2. OM1 with 50% stenosis distal to previously placed stent. Ostia of OM2 with 50% stenosis, 70% stenosis distal to previously placed stent.      Right Coronary Artery   Mid RCA has 2 layers of stents with evidence of 99% ISR. Long segment of ISR up to 80% at the mid and distal RCA. The distal RCA is filled via left to right collaterals.      Intervention     No interventions have been documented.       Quality:  VTE Pharmacologic Prophylaxis: No pharmacologic Venous Thromboembolism prophylaxis due to Active Bleeding / Risk of Bleeding  VTE Mechanical Prophylaxis: Yes       CHADSVASC Stroke Risk Points     Patient's CHADSVASC Score Total = 3    Patient's CHADSVASC Score Summary    Element Patient Score   Congestive Heart Failure 0   High blood pressure 1   Age 0    Diabetes 1   Stroke/TIA/Clot 0   Vascular disease 1   Sex 0       Elements Composing the CHADSVASC Score    Score Element Points    Congestive Heart Failure 1   High blood pressure 1   Age (above 75) 2   Diabetes 1   Stroke/TIA/Clot 2   Vascular disease 1   Age (65-74) 1   Sex (female) 1   Max Score 9       STS predicted risks of isolated CABG:   Risk of Mortality: 0.781%  Renal Failure: 0.883%  Permanent Stroke: 0.734%  Prolonged Ventilation: 5.055%  DSW Infection: 0.455%  Reoperation: 1.963%  Morbidity or Mortality: 7.551%  Short Length of Stay: 54.534%  Long Length of Stay: 2.885%    Clinical Frailty Scale  4 Vulnerable - not dependent on others for daily help, often symptoms limit activities; common complaints is being \"slowed up\" and/or being tired during the day    Malnutrition Screening Tool     Question 1  Is the pt able to complete the assessment at this time?  yes    Question 2  Have you lost weight without trying?  Yes:   Question 3  If yes, how much weight have you lost? Unsure: Weight loss score  = 2 points   Question 4  Have you been eating poorly because of a decreased appetite?  Yes = 1 point  .........................................................................................................  Add weight loss score and appetite score to calculate MST Score    MST SCORE 3  If two or more, consult dietician if possible    New York Heart Association Classification  Class III: Comfortable at rest, symptomatic with less than ordinary activity     Impression/Plan:  Chauncey Burroughs is a 61 year-old male with a significant PMH for CAD s/p NSTEMI with GUADALUPE to mCx, OM2, and mRCA (10/31/2017), STEMI s/p GUADALUPE to dRCA and pRCA (7/12/2022), GUADALUPE to mCx, dLAD, mLAD, and pLAD (8/12/2022) on Plavix with last dose 4/25/23 at 0920, early family history of CAD (brother underwent CABG at 40), HTN, HLD, remote tobacco use (quit 2012), poorly-controlled type 2 DM, traumatic fall (1987) s/p multiple back surgeries, s/p left nephrectomy (1998, secondary to complication/bleeding following spinal surgery), chronic osteomyelitis (L3-L5), severe peripheral  neuropathy, chronic pain with history of intrathecal pump and nerve stimulator (intrathecal pump removed per patient, pain contract with Dr. Lopez), osteomyelitis s/p right second and third toe amputation, anemia of chronic disease, DVT secondary to PICC line, and GERD. He presented to [facility] [place] ED on 4/23/23 for evaluation of back pain and multiple falls. He had been recently evaluated for leg weakness and incontinence by [facility] with MRI thoracic spine (3/23/23) that did not demonstrate any acute findings to explain patient symptoms. He was referred to Neurology. Initial work up in the ED with EKG demonstrated nonspecific T-wave abnormality with elevated high-sensitivity troponin 174 -> 458. He was also noted to have elevated WBC 12.6 and lactic acid 3.1. CT head wo contrast was obtained with no acute intracranial findings. CT abdomen pelvis with contrast was obtained with no acute findings and incidential finding of multiple small pulmonary nodules. He was admitted for further medical evaluation. Neurology was consulted and MRI lumbar spine was ordered. Limited MRI was obtained that demonstrated stable postsurgical and degenerative changes, however MRI was aborted due to patient developing heat and pain around surgical hardware. CT lumbar spine was obtained that demonstrated multilevel degenerative changes in the lumbar spine and postsurgical changes of the thoracic and lumbar spine. Neurology has recommended further imaging with lumbar myelogram and postmyelogram CT for suspected disc fragment irritating nerve root and causing left lumbosacral radiculopathy. Cardiology was also consulted and limited echocardiogram was obtained on 4/24/23 that demonstrated reduced LVEF 48%. He subsequently underwent cardiac catheterization on 4/27/23 that revealed severe multivessel CAD. He has been referred for surgical evaluation.     He has been scheduled for urgent coronary artery bypass grafting, endoscopic vein harvesting, and  left atrial appendage ligation on Monday, May 1, 2023 at [facility]. The risks, benefits, and alternatives were discussed with the patient and he agrees to proceed. He does  wish to receive blood products if necessary.     STS risk calculator score was calculated and discussed with the patient/family prior to surgery as documented in the medical record             CARDIOTHORACIC SURGERY    I, Morales Hi MD, have seen, examined, and evaluated this patient and agree with the data and physical exam as documented in the above note.    Additional pertinent exam findings: Patient seen and discussed.  Multivessel CAD.  Discussion had with patient about risks and benefits of surgical correction.  All questions answered.  Will proceed.  CHADSVASC is 2 or above, thus based on recent peer reviewed data the decision was made to ligate the left atrial appendage.      I have reviewed all pertinent images and lab work and discussed my interpretation with the patient.  I have also discussed the patients current medications and how they will be managed    Plan: CABG and VANESSA ligation     Risks (including bleeding, death, infection, stroke and potential use of blood products), benefits and alternatives were discussed with the patient and he agrees to proceed with the plan as documented above.    Thank you for allowing me to take part in this patient's care    Morales Hi MD  Cardiac Surgery            received hydralazine iv for elevated bp

## 2023-07-21 NOTE — DISCHARGE NOTE PROVIDER - NSDCCAREPROVSEEN_GEN_ALL_CORE_FT
Dominik De León Ada Samson Geltman  Hoorbod Delshadfar  Hoorbod Delshadfar  Hoorbod Delshadfar  [facility] PracticeTeam [facility]  Emmy Flood  Team [facility] Endocrinology  Chris Cage Muthoottupadinjarethil  Patric Dietz  Ordering Physician  Kassy Zepeda No Residual Tumor Seen Histology Text: There were no malignant cells seen in the sections examined.

## 2024-11-06 ENCOUNTER — APPOINTMENT (OUTPATIENT)
Dept: GASTROENTEROLOGY | Facility: CLINIC | Age: 59
End: 2024-11-06
Payer: COMMERCIAL

## 2024-11-06 VITALS
SYSTOLIC BLOOD PRESSURE: 154 MMHG | HEIGHT: 67 IN | OXYGEN SATURATION: 97 % | BODY MASS INDEX: 28.25 KG/M2 | HEART RATE: 90 BPM | WEIGHT: 180 LBS | DIASTOLIC BLOOD PRESSURE: 95 MMHG

## 2024-11-06 DIAGNOSIS — I51.9 HEART DISEASE, UNSPECIFIED: ICD-10-CM

## 2024-11-06 DIAGNOSIS — K63.5 POLYP OF COLON: ICD-10-CM

## 2024-11-06 DIAGNOSIS — R19.5 OTHER FECAL ABNORMALITIES: ICD-10-CM

## 2024-11-06 PROCEDURE — 99203 OFFICE O/P NEW LOW 30 MIN: CPT

## 2024-11-06 RX ORDER — METOPROLOL TARTRATE 75 MG/1
TABLET, FILM COATED ORAL
Refills: 0 | Status: ACTIVE | COMMUNITY

## 2024-11-06 RX ORDER — PRIMIDONE 50 MG/1
50 TABLET ORAL
Refills: 0 | Status: ACTIVE | COMMUNITY

## 2024-11-06 RX ORDER — SODIUM SULFATE, POTASSIUM SULFATE AND MAGNESIUM SULFATE 1.6; 3.13; 17.5 G/177ML; G/177ML; G/177ML
17.5-3.13-1.6 SOLUTION ORAL
Qty: 2 | Refills: 0 | Status: ACTIVE | COMMUNITY
Start: 2024-11-06 | End: 1900-01-01

## 2024-11-06 RX ORDER — PANTOPRAZOLE 40 MG/1
40 TABLET, DELAYED RELEASE ORAL TWICE DAILY
Qty: 120 | Refills: 2 | Status: ACTIVE | COMMUNITY
Start: 2024-11-06 | End: 1900-01-01

## 2024-11-06 RX ORDER — LISINOPRIL 30 MG/1
TABLET ORAL
Refills: 0 | Status: ACTIVE | COMMUNITY

## 2024-11-08 LAB
BASOPHILS # BLD AUTO: 0.08 K/UL
BASOPHILS NFR BLD AUTO: 1.1 %
EOSINOPHIL # BLD AUTO: 0.37 K/UL
EOSINOPHIL NFR BLD AUTO: 5 %
HCT VFR BLD CALC: 48 %
HGB BLD-MCNC: 14.6 G/DL
IMM GRANULOCYTES NFR BLD AUTO: 0.1 %
LYMPHOCYTES # BLD AUTO: 2.18 K/UL
LYMPHOCYTES NFR BLD AUTO: 29.3 %
MAN DIFF?: NORMAL
MCHC RBC-ENTMCNC: 25.5 PG
MCHC RBC-ENTMCNC: 30.4 G/DL
MCV RBC AUTO: 83.9 FL
MONOCYTES # BLD AUTO: 0.61 K/UL
MONOCYTES NFR BLD AUTO: 8.2 %
NEUTROPHILS # BLD AUTO: 4.19 K/UL
NEUTROPHILS NFR BLD AUTO: 56.3 %
PLATELET # BLD AUTO: 350 K/UL
RBC # BLD: 5.72 M/UL
RBC # FLD: 15.7 %
WBC # FLD AUTO: 7.44 K/UL

## 2024-11-25 ENCOUNTER — APPOINTMENT (OUTPATIENT)
Dept: GASTROENTEROLOGY | Facility: HOSPITAL | Age: 59
End: 2024-11-25

## 2024-12-06 NOTE — ASU PATIENT PROFILE, ADULT - FALL HARM RISK - UNIVERSAL INTERVENTIONS
Bed in lowest position, wheels locked, appropriate side rails in place/Call bell, personal items and telephone in reach/Instruct patient to call for assistance before getting out of bed or chair/Non-slip footwear when patient is out of bed/[place] to call system/Purposeful Proactive Rounding/Room/bathroom lighting operational, light cord in reach

## 2024-12-06 NOTE — ASU PATIENT PROFILE, ADULT - NSICDXPASTMEDICALHX_GEN_ALL_CORE_FT
PAST MEDICAL HISTORY:  CAD (coronary artery disease) s/p stent x2 in 2013    Diabetes     HLD (hyperlipidemia)     HTN (hypertension)

## 2024-12-06 NOTE — ASU PATIENT PROFILE, ADULT - AS SC BRADEN SENSORY
Normal sensing pacing via iterative testing, excellent threshold capture, few episodes of transient high ventricular rate lasted 2 minutes in 150 with high atrial rate recorded, most recent on May 31 no reprogramming
(4) no impairment

## 2024-12-09 ENCOUNTER — OUTPATIENT (OUTPATIENT)
Dept: OUTPATIENT SERVICES | Facility: HOSPITAL | Age: 59
LOS: 1 days | Discharge: HOME | End: 2024-12-09
Payer: COMMERCIAL

## 2024-12-09 ENCOUNTER — APPOINTMENT (OUTPATIENT)
Dept: GASTROENTEROLOGY | Facility: HOSPITAL | Age: 59
End: 2024-12-09

## 2024-12-09 ENCOUNTER — RESULT REVIEW (OUTPATIENT)
Age: 59
End: 2024-12-09

## 2024-12-09 ENCOUNTER — TRANSCRIPTION ENCOUNTER (OUTPATIENT)
Age: 59
End: 2024-12-09

## 2024-12-09 VITALS
SYSTOLIC BLOOD PRESSURE: 122 MMHG | OXYGEN SATURATION: 99 % | DIASTOLIC BLOOD PRESSURE: 85 MMHG | RESPIRATION RATE: 18 BRPM | HEART RATE: 66 BPM

## 2024-12-09 VITALS
RESPIRATION RATE: 20 BRPM | OXYGEN SATURATION: 99 % | SYSTOLIC BLOOD PRESSURE: 158 MMHG | WEIGHT: 179.9 LBS | TEMPERATURE: 97 F | DIASTOLIC BLOOD PRESSURE: 99 MMHG | HEART RATE: 88 BPM | HEIGHT: 67 IN

## 2024-12-09 DIAGNOSIS — Z95.5 PRESENCE OF CORONARY ANGIOPLASTY IMPLANT AND GRAFT: Chronic | ICD-10-CM

## 2024-12-09 DIAGNOSIS — R19.5 OTHER FECAL ABNORMALITIES: ICD-10-CM

## 2024-12-09 LAB
GLUCOSE BLDC GLUCOMTR-MCNC: 108 MG/DL — HIGH (ref 70–99)
GLUCOSE BLDC GLUCOMTR-MCNC: 141 MG/DL — HIGH (ref 70–99)

## 2024-12-09 PROCEDURE — 88305 TISSUE EXAM BY PATHOLOGIST: CPT | Mod: 26

## 2024-12-09 PROCEDURE — 43239 EGD BIOPSY SINGLE/MULTIPLE: CPT

## 2024-12-09 PROCEDURE — 45390 COLONOSCOPY W/RESECTION: CPT

## 2024-12-09 NOTE — ASU PREOP CHECKLIST - NS PREOP CHK MONITOR ANESTHESIA CONSENT
Patient back from dialysis. VSS. Assisted with ordering dinner. Will give prn dilaudid for 7/10 pain. Will monitor. done

## 2024-12-09 NOTE — ASU DISCHARGE PLAN (ADULT/PEDIATRIC) - FINANCIAL ASSISTANCE
[facility] provides services at a reduced cost to those who are determined to be eligible through [facility]’s financial assistance program. Information regarding [facility]’s financial assistance program can be found by going to [email or web address] or by calling [phone].

## 2024-12-09 NOTE — ASU PREOP CHECKLIST - TEMPERATURE IN CELSIUS (DEGREES C)
RX refill request sent via e-scribe from [facility]'s pharmacy on [address] [place]. Requesting a refill on Losartan 100 mg tablet. QTY 90 no refills at this time. RX sent today as requested.    36.1

## 2024-12-09 NOTE — PRE PROCEDURE NOTE - PRE PROCEDURE EVALUATION
Attending Physician:   Rain  Procedure: colonoscopy    Indication for Procedure: ascending colon TVA   ________________________________________________________  PAST MEDICAL & SURGICAL HISTORY:  HTN (hypertension)      HLD (hyperlipidemia)      Diabetes      CAD (coronary artery disease)  s/p stent x2 in 2013      Stented coronary artery        ALLERGIES:  No Known Allergies    HOME MEDICATIONS:  aspirin 81 mg oral delayed release capsule: 1 tab(s) orally once a day  Janumet 50 mg-1000 mg oral tablet: 1 tab(s) orally 2 times a day  Levemir FlexPen 100 units/mL subcutaneous solution: 20-25 units once a day at bedtime  Lipitor 10 mg oral tablet: 1 tab(s) orally once a day  losartan 50 mg oral tablet: 1 tab(s) orally once a day  Multiple Vitamins oral tablet: 1 tab(s) orally once a day    AICD/PPM: [ ] yes   [x ] no    PERTINENT LAB DATA:                      PHYSICAL EXAMINATION:    T(C): --  HR: --  BP: --  RR: --  SpO2: --    Constitutional: NAD  Neck:  supple  Respiratory: no respiratory stress, no audible wheezes  Cardiovascular: RR  Gastrointestinal: soft, NT/ND  Extremities: No peripheral edema  Neurological: Alert, no focal deficits  Psychiatric: Normal mood, normal affect  Skin: No rashes      COMMENTS:    The patient is a suitable candidate for the planned procedure unless box checked [ ]  No, explain:

## 2024-12-12 LAB — SURGICAL PATHOLOGY STUDY: SIGNIFICANT CHANGE UP

## 2025-01-08 DIAGNOSIS — A04.8 OTHER SPECIFIED BACTERIAL INTESTINAL INFECTIONS: ICD-10-CM

## 2025-01-08 RX ORDER — METRONIDAZOLE 500 MG/1
500 TABLET ORAL TWICE DAILY
Qty: 28 | Refills: 0 | Status: ACTIVE | COMMUNITY
Start: 2025-01-08 | End: 1900-01-01

## 2025-01-08 RX ORDER — BISMUTH SUBSALICYLATE 262 MG/1
262 TABLET, CHEWABLE ORAL 4 TIMES DAILY
Qty: 56 | Refills: 0 | Status: ACTIVE | COMMUNITY
Start: 2025-01-08 | End: 1900-01-01

## 2025-01-08 RX ORDER — OMEPRAZOLE 40 MG/1
40 CAPSULE, DELAYED RELEASE ORAL TWICE DAILY
Qty: 28 | Refills: 0 | Status: ACTIVE | COMMUNITY
Start: 2025-01-08 | End: 1900-01-01

## 2025-01-08 RX ORDER — CLARITHROMYCIN 500 MG/1
500 TABLET, FILM COATED ORAL
Qty: 28 | Refills: 0 | Status: ACTIVE | COMMUNITY
Start: 2025-01-08 | End: 1900-01-01

## (undated) DEVICE — ATTACHMENT DISTAL 4X15MM

## (undated) DEVICE — CONTAINER FORMALIN 80ML YELLOW

## (undated) DEVICE — DRSG BANDAID 0.75X3"

## (undated) DEVICE — BASIN EMESIS 10IN GRADUATED MAUVE

## (undated) DEVICE — GOWN LG

## (undated) DEVICE — DENTURE CUP PINK

## (undated) DEVICE — POLY TRAP ETRAP

## (undated) DEVICE — BIOPSY FORCEP RADIAL JAW 4 STANDARD WITH NEEDLE

## (undated) DEVICE — BIOPSY FORCEP COLD DISP

## (undated) DEVICE — TUBING SUCTION NONCONDUCTIVE 6MM X 12FT

## (undated) DEVICE — LUBRICATING JELLY HR ONE SHOT 3G

## (undated) DEVICE — ELCTR ECG CONDUCTIVE ADHESIVE

## (undated) DEVICE — Device

## (undated) DEVICE — SNARE LESIONHUNTER ROTAT NITNL COLD 10MM

## (undated) DEVICE — SALIVA EJECTOR (BLUE)

## (undated) DEVICE — DRSG 2X2

## (undated) DEVICE — TUBING MEDI-VAC W MAXIGRIP CONNECTORS 1/4"X6'

## (undated) DEVICE — UNDERPAD LINEN SAVER 17 X 24"

## (undated) DEVICE — PACK IV START WITH CHG

## (undated) DEVICE — SNARE CAPTIVATOR RND COLD STIFF 2.4X10MM 240CM

## (undated) DEVICE — TUBING IV SET GRAVITY 3Y 100" MACRO

## (undated) DEVICE — CATH IV SAFE BC 22G X 1" (BLUE)

## (undated) DEVICE — DRSG CURITY GAUZE SPONGE 4 X 4" 12-PLY NON-STERILE

## (undated) DEVICE — BITE BLOCK ADULT 20 X 27MM (GREEN)

## (undated) DEVICE — CLAMP BX HOT RAD JAW 3

## (undated) DEVICE — ELCTR GROUNDING PAD ADULT COVIDIEN